# Patient Record
Sex: MALE | Race: OTHER | NOT HISPANIC OR LATINO
[De-identification: names, ages, dates, MRNs, and addresses within clinical notes are randomized per-mention and may not be internally consistent; named-entity substitution may affect disease eponyms.]

---

## 2023-03-30 PROBLEM — Z00.00 ENCOUNTER FOR PREVENTIVE HEALTH EXAMINATION: Status: ACTIVE | Noted: 2023-03-30

## 2023-03-31 ENCOUNTER — APPOINTMENT (OUTPATIENT)
Dept: ORTHOPEDIC SURGERY | Facility: CLINIC | Age: 78
End: 2023-03-31
Payer: MEDICARE

## 2023-03-31 VITALS
DIASTOLIC BLOOD PRESSURE: 66 MMHG | OXYGEN SATURATION: 98 % | BODY MASS INDEX: 33.21 KG/M2 | HEIGHT: 70 IN | HEART RATE: 68 BPM | WEIGHT: 232 LBS | SYSTOLIC BLOOD PRESSURE: 131 MMHG | TEMPERATURE: 98.4 F

## 2023-03-31 PROCEDURE — 72170 X-RAY EXAM OF PELVIS: CPT

## 2023-03-31 PROCEDURE — 73564 X-RAY EXAM KNEE 4 OR MORE: CPT | Mod: 50

## 2023-03-31 PROCEDURE — 99203 OFFICE O/P NEW LOW 30 MIN: CPT

## 2023-03-31 NOTE — REVIEW OF SYSTEMS
[Joint Pain] : joint pain [Negative] : Gastrointestinal [Joint Stiffness] : no joint stiffness [Joint Swelling] : no joint swelling [Fever] : no fever [Chills] : no chills [FreeTextEntry5] : CAD s/p cath , CHF, HTN, HLD [FreeTextEntry6] : ASHOK [FreeTextEntry8] : BPH [de-identified] : Gout [de-identified] : Lumbar Radiculopathy [de-identified] : Diabetes

## 2023-03-31 NOTE — PHYSICAL EXAM
[de-identified] : Constitutional:  77 year old male, alert and oriented, cooperative, in no acute distress.\par \par HEENT \par NC/AT.  Appearance: symmetric\par \par Chest/Respiratory \par Respiratory effort: no intercostal retractions or use of accessory muscles. Nonlabored Breathing\par \par Mental Status: \par Judgment, insight: intact\par Orientation: oriented to time, place, and person\par \par Left Knee\par \par Inspection:\par     Skin intact, no rashes or lesions\par     No Effusion\par     Non-tender to palpation over tibial tubercle, patella,and pes insertion.\par     Tenderness over the medial and lateral joint lines (anteriorly and at midcoronal line)\par \par Range of Motion:\par 	Extension - 0 degrees\par 	Flexion - 120 degrees\par                 Alignment: Varus 3 degrees\par 	Extensor lag: None\par \par Stability:\par      Demonstrates no Varus or Valgus instability\par      Negative Anterior or Posterior drawer.\par      Negative Lachman's\par      Negative McMurrays\par \par Patella: stable, tracks well. \par \par Right Knee\par \par Inspection:\par     Skin intact, no rashes or lesions\par     No Effusion\par     Non-tender to palpation over tibial tubercle and pes insertion.\par     Tenderness over patella. Tenderness over the medial and lateral joint lines (anteriorly and midcoronal line)\par \par Range of Motion:\par 	Extension - 0 degrees\par 	Flexion - 120 degrees\par 	Alignment - Varus 3 degrees\par 	Extensor lag: None\par \par Stability:\par      Demonstrates no Varus or Valgus instability\par      Negative Anterior or Posterior drawer.\par      Negative Lachman's\par      Negative McMurrays\par \par Patella: stable, tracks well. \par \par Neurologic Exam\par     Motor intact including 5/5 Extensor Hallucis Longus, 5/5 Flexor Hallucis Longus, 5/5 Tibialis Anterior and 5/5 Gastrocnemius\par     Sensation Intact to Light Touch including Saphenous, Sural, Superficial Peroneal, Deep Peroneal, Tibial nerve distributions\par \par Vascular Exam\par     Foot is warm and well perfused with 2+ Dorsalis Pedis Pulse \par \par No pain with range of motion of the bilateral hips. No lumbar paraspinal muscle tenderness. There is tenderness over the midline lumbar spine and bilateral SI joints. [de-identified] : XRay:  XRays of the Pelvis (1 View) taken in the office today and discussed with the patient. XRays demonstrate no obvious fracture or dislocation. There is joint space narrowing in the bilateral hips. (my personal interpretation). \par \par XRay: XRays of the Right Knee (4 Views) taken in the office today and reviewed with the patient. XRays demonstrate tricompartmental joint space narrowing with bone on bone articulations, with subchondral sclerosis, overlying osteophytes, all consistent with severe osteoarthritis, KL rdGrdrrdarddrderd:rd rd3rd. There appears to be a healed prior distal femur fracture. (my personal interpretation) \par \par XRay: XRays of the Left Knee (4 Views) taken in the office today and reviewed with the patient. XRays demonstrate tricompartmental joint space narrowing, worse in the medial compartment, with subchondral sclerosis, overlying osteophytes, all consistent with osteoarthritis, KL Grade: 2-3. (my personal interpretation)

## 2023-03-31 NOTE — HISTORY OF PRESENT ILLNESS
[de-identified] : Mr. Guzmán is a 77-year-old male who presents to the office for evaluation of his bilateral knee pain.  Patient has been experiencing bilateral (left greater than right) knee pain for several years, but has been worsening over the last month.  He states that he has significant pain in that it can affect his sleep.  Pain is located over the medial, lateral, and anterior knees bilaterally.  Pain can radiate to the foot.  He also reports some crepitus.  Pain is worse at night and with walking.  He has not noted any relieving factors at this time.  He has tried diclofenac and Tylenol, without significant relief.  Patient has tried physical therapy, last about 1 month ago.  He has tried injections, last on 1/25/2023.  Patient was getting injections every 6 months with about 7 injections in the left knee and 2 injections in the right knee.  His last injection helped for about 2 to 3 weeks.  He is also previously had a knee aspiration, which helped.  Pain is now affecting his quality of life and causing difficulty sleeping and resting.  Patient has been using a cane for the last 3 to 4 years.  He does have a history of lumbar radiculopathy and was referred by his primary care physician to pain management for potential epidural injections.  Patient also has a history of a right knee fracture in 1980 that was treated nonoperatively.

## 2023-03-31 NOTE — DISCUSSION/SUMMARY
[de-identified] : Mr. Guzmán is a 77-year-old male who presented to the office for evaluation of his bilateral knee pain.  Patient has been experiencing bilateral (left greater than right) knee pain for several years, but has worsened over the last few months.  He has tried physical therapy, anti-inflammatories, and injections.  X-rays showed significant bilateral knee osteoarthritis.  Examination showed some tenderness over the knees, but otherwise good bilateral knee range of motion. Discussed with the patient the examination and imaging findings.  Discussed with the patient the operative and nonoperative management of knee osteoarthritis, including total knee arthroplasty.   Discussed total knee arthroplasty at length, including surgical procedure, hospital course, DVT prophylaxis, antibiotics, physical therapy, recovery, and risks. Patient would like to continue nonoperative management at this time.  Discussed the nonoperative management of knee osteoarthritis, including physical therapy, anti-inflammatories, and injections.  Patient was given referral for physical therapy.  Patient will continue his diclofenac as needed.  Patient would like to think about his options, including total knee arthroplasty versus further injections.  Discussed that it be beneficial to undergo further evaluation of his lumbar radiculopathy by a spine specialist.  Patient was given referral to Dr. Barker. Patient will follow-up in 2 weeks for reevaluation and management.  Patient understanding and in agreement the plan.  All questions answered. \par \par \par Plan:\par -Physical therapy\par -Continue diclofenac\par -Follow-up with Dr. Barker for evaluation of lumbar radiculopathy\par -Follow-up in 2 weeks for reevaluation and management.  Consideration of TKA versus injection.

## 2023-04-03 ENCOUNTER — APPOINTMENT (OUTPATIENT)
Dept: ORTHOPEDIC SURGERY | Facility: CLINIC | Age: 78
End: 2023-04-03
Payer: MEDICARE

## 2023-04-03 VITALS
OXYGEN SATURATION: 98 % | BODY MASS INDEX: 33.21 KG/M2 | TEMPERATURE: 97.6 F | HEART RATE: 65 BPM | HEIGHT: 70 IN | WEIGHT: 232 LBS | DIASTOLIC BLOOD PRESSURE: 74 MMHG | SYSTOLIC BLOOD PRESSURE: 153 MMHG

## 2023-04-03 PROCEDURE — 72082 X-RAY EXAM ENTIRE SPI 2/3 VW: CPT

## 2023-04-03 PROCEDURE — 99214 OFFICE O/P EST MOD 30 MIN: CPT

## 2023-04-03 RX ORDER — IBUPROFEN 800 MG/1
800 TABLET, FILM COATED ORAL 3 TIMES DAILY
Qty: 42 | Refills: 0 | Status: ACTIVE | COMMUNITY
Start: 2023-04-03 | End: 1900-01-01

## 2023-04-03 NOTE — PHYSICAL EXAM
[de-identified] : Lumbar Physical Exam\par \par Gait - Antalgic, ambulating with a walker \par \par Station - Forward pitched posture \par \par Sagittal balance - Normal\par \par Compensatory mechanism? - None\par \par Reflexes\par Patellar - normal\par Gastroc - normal\par Clonus - No\par \par Hip Exam - Normal\par \par Straight leg raise - none\par \par Pulses - 2+ dp/pt\par \par Range of motion - normal\par \par Sensation\par Sensation intact to light touch in L1, L2, L3, L4, L5 and S1 dermatomes bilaterally\par \par Motor \par 	IP	Quad	HS	TA	Gastroc	EHL\par Right	5/5	5/5	5/5	5/5	5/5	5/5\par Left	3+/5	5/5	5/5	5/5	5/5	5/5  [de-identified] : Scoliosis radiographs \par SVA within normal limits \par degenerative scoliosis noted \par disc degeneration noted

## 2023-04-03 NOTE — ASSESSMENT
[FreeTextEntry1] : I had a lengthy discussion with the patient in regards to their treatment plan and diagnosis. Their symptoms have persisted despite the conservative management they have attempted thus far.  As a result I would like to proceed with a lumbar MRI.  In tandem with this they should begin a physical therapy/home therapy program.  The patient can take Tylenol/NSAIDs as needed for pain control if medically able to.  I will have the patient follow-up in 3 to 4 weeks for repeat clinical evaluation.  I encouraged them to follow-up sooner if their symptoms worsen or change in any way. \par Conservative Treatment Statement\par The patient has tried the following treatments:\par Activity modification         	+\par Ice/Compression                    +\par Braces                                     -\par NSAIDS                                  +\par Physical Therapy                    +\par \par Please note the above modalities have been tried for 6+ weeks over the past 2 months.

## 2023-04-03 NOTE — HISTORY OF PRESENT ILLNESS
[de-identified] : 77 year old male who presents for initial evaluation of his lower back pain and radicular sxs down the posterior aspect of his b/l lower extremities. Patient reports hes been experiencing back pain for years but reports onset of radicular sxs for the past few months. Patient reports pain is exacerbated when he is laying down. Patient reports he would be able to walk 5 blocks with no difficulty. Patient reports taking Tylenol for sxs with no relief. Patient reports attending multiple sessions of physical therapy with minor relief. \par Denies any saddle anesthesia. Denies any bowel/bladder incontinence

## 2023-04-03 NOTE — ADDENDUM
[FreeTextEntry1] : I, Sarah Kat, acted solely as a scribe for Dr. Aaron Barker MD on this date 04/03/2023  \par \par All medical record entries made by the Scribe were at my, Dr. Aaron Barker MD., direction and personally dictated by me on 04/03/2023 . I have reviewed the chart and agree that the record accurately reflects my personal performance of the history, physical exam, assessment and plan. I have also personally directed, reviewed, and agreed with the chart.

## 2023-04-05 RX ORDER — DIAZEPAM 10 MG/1
10 TABLET ORAL
Qty: 1 | Refills: 0 | Status: ACTIVE | COMMUNITY
Start: 2023-04-05 | End: 1900-01-01

## 2023-04-08 ENCOUNTER — OUTPATIENT (OUTPATIENT)
Dept: OUTPATIENT SERVICES | Facility: HOSPITAL | Age: 78
LOS: 1 days | End: 2023-04-08
Payer: MEDICARE

## 2023-04-08 ENCOUNTER — APPOINTMENT (OUTPATIENT)
Dept: MRI IMAGING | Facility: IMAGING CENTER | Age: 78
End: 2023-04-08
Payer: MEDICARE

## 2023-04-08 DIAGNOSIS — Z00.8 ENCOUNTER FOR OTHER GENERAL EXAMINATION: ICD-10-CM

## 2023-04-08 PROCEDURE — 72148 MRI LUMBAR SPINE W/O DYE: CPT

## 2023-04-08 PROCEDURE — 72148 MRI LUMBAR SPINE W/O DYE: CPT | Mod: 26

## 2023-04-25 ENCOUNTER — APPOINTMENT (OUTPATIENT)
Dept: ORTHOPEDIC SURGERY | Facility: CLINIC | Age: 78
End: 2023-04-25
Payer: MEDICARE

## 2023-04-25 PROCEDURE — 99213 OFFICE O/P EST LOW 20 MIN: CPT

## 2023-04-25 RX ORDER — HYLAN G-F 20 16MG/2ML
16 SYRINGE (ML) INTRAARTICULAR
Qty: 1 | Refills: 0 | Status: ACTIVE | COMMUNITY
Start: 2023-04-25

## 2023-04-25 NOTE — DISCUSSION/SUMMARY
[de-identified] : Mr. Guzmán is a 77-year-old male who presented to the office for evaluation of his bilateral knee pain.  Patient has been experiencing bilateral (left greater than right) knee pain for several years, but has worsened over the last few months.  He has tried physical therapy, anti-inflammatories, and injections.  Prior x-rays showed significant bilateral knee osteoarthritis.  Examination showed some tenderness over the knees, but otherwise good bilateral knee range of motion. Discussed with the patient the examination and imaging findings.  Discussed with the patient the operative and nonoperative management of knee osteoarthritis, including total knee arthroplasty.  Patient would like to continue nonoperative management at this time.  Discussed the nonoperative management of knee osteoarthritis, including physical therapy, anti-inflammatories, and injections.  Patient will continue his physical therapy.  Patient will take anti-inflammatories as needed.  Patient would like to try viscosupplementation injections.  Discussed the risks of viscosupplementation injections including the risk that injections do not help with pain.  Left knee Synvisc injections were ordered.  Discussed following up with Dr. Barker for follow-up of his lumbar spine MRI.  Patient will follow-up for his left knee Synvisc injections.  Patient understanding and in agreement the plan.  All questions answered. \par \par Plan:\par -Left knee Synvisc injections ordered\par -Continue physical therapy\par -Continue anti-inflammatories as needed\par -Follow-up with Dr. Barker for management of lumbar radiculopathy\par -Follow-up for left knee Synvisc injections

## 2023-04-25 NOTE — PHYSICAL EXAM
[de-identified] : Constitutional:  77 year old male, alert and oriented, cooperative, in no acute distress.\par \par HEENT \par NC/AT.  Appearance: symmetric\par \par Chest/Respiratory \par Respiratory effort: no intercostal retractions or use of accessory muscles. Nonlabored Breathing\par \par Mental Status: \par Judgment, insight: intact\par Orientation: oriented to time, place, and person\par \par Left Knee\par \par Inspection:\par     Skin intact, no rashes or lesions\par     No Effusion\par     Non-tender to palpation over tibial tubercle and pes insertion.\par     Tenderness over the medial and lateral joint lines (anteriorly and at midcoronal line). Tenderness over the patellar tendon\par \par Range of Motion:\par 	Extension - 0 degrees\par 	Flexion - 120 degrees\par                 Alignment: Varus 3 degrees\par 	Extensor lag: None\par \par Stability:\par      Demonstrates no Varus or Valgus instability\par      Negative Anterior or Posterior drawer.\par      Negative Lachman's\par      Negative McMurrays\par \par Patella: stable, tracks well. \par \par Right Knee\par \par Inspection:\par     Skin intact, no rashes or lesions\par     No Effusion\par     Non-tender to palpation over tibial tubercle, medial and lateral joint line, and pes insertion.\par     Tenderness over the posterior knee\par \par Range of Motion:\par 	Extension - 0 degrees\par 	Flexion - 120 degrees\par 	Alignment - Varus 3 degrees\par 	Extensor lag: None\par \par Stability:\par      Demonstrates no Varus or Valgus instability\par      Negative Anterior or Posterior drawer.\par      Negative Lachman's\par      Negative McMurrays\par \par Patella: stable, tracks well. \par \par Neurologic Exam\par     Motor intact including 5/5 Extensor Hallucis Longus, 5/5 Flexor Hallucis Longus, 5/5 Tibialis Anterior and 5/5 Gastrocnemius\par     Sensation Intact to Light Touch including Saphenous, Sural, Superficial Peroneal, Deep Peroneal, Tibial nerve distributions\par     Decreased sensation on the left compared to the right.\par \par Vascular Exam\par     Foot is warm and well perfused with 2+ Dorsalis Pedis Pulse \par \par No pain with range of motion of the bilateral hips. There is lumbar paraspinal muscle tenderness. There is tenderness over the midline lumbar spine and bilateral SI joints. [de-identified] : XRay:  XRays of the Pelvis (1 View) taken on 3/31/2023. XRays demonstrate no obvious fracture or dislocation. There is joint space narrowing in the bilateral hips. (my personal interpretation). \par \par XRay: XRays of the Right Knee (4 Views) taken on 3/31/2023. XRays demonstrate tricompartmental joint space narrowing with bone on bone articulations, with subchondral sclerosis, overlying osteophytes, all consistent with severe osteoarthritis, KL thGthrthathdtheth:th th5th. There appears to be a healed prior distal femur fracture. (my personal interpretation) \par \par XRay: XRays of the Left Knee (4 Views) taken  on 3/31/2023. XRays demonstrate tricompartmental joint space narrowing, worse in the medial compartment, with subchondral sclerosis, overlying osteophytes, all consistent with osteoarthritis, KL Grade: 2-3. (my personal interpretation)

## 2023-04-25 NOTE — HISTORY OF PRESENT ILLNESS
[de-identified] : Nelia  ID: [id number]\par \par 4/25/2023\par \par Mr. Guzmán is a 77-year-old male who presents to the office for follow-up of his bilateral knee pain.  Patient has been experiencing bilateral (left greater than right) knee pain for several years.  He was previously seen in the office and referred to physical therapy.  He has been in physical therapy, which helps while he is at physical therapy, but his pain returns when he goes home.  He states that overall, his pain is unchanged.  Pain is located over the anterior knee and radiates down the leg to the foot.  He underwent recent lumbar spine MRI by Dr. Barker.  No numbness or tingling.  He does have some tenderness over the knee and lower leg.  Patient has previously tried diclofenac and multiple injections for his knee pain.\par \par 3/31/2023\par Mr. Guzmán is a 77-year-old male who presents to the office for evaluation of his bilateral knee pain.  Patient has been experiencing bilateral (left greater than right) knee pain for several years, but has been worsening over the last month.  He states that he has significant pain in that it can affect his sleep.  Pain is located over the medial, lateral, and anterior knees bilaterally.  Pain can radiate to the foot.  He also reports some crepitus.  Pain is worse at night and with walking.  He has not noted any relieving factors at this time.  He has tried diclofenac and Tylenol, without significant relief.  Patient has tried physical therapy, last about 1 month ago.  He has tried injections, last on 1/25/2023.  Patient was getting injections every 6 months with about 7 injections in the left knee and 2 injections in the right knee.  His last injection helped for about 2 to 3 weeks.  He is also previously had a knee aspiration, which helped.  Pain is now affecting his quality of life and causing difficulty sleeping and resting.  Patient has been using a cane for the last 3 to 4 years.  He does have a history of lumbar radiculopathy and was referred by his primary care physician to pain management for potential epidural injections.  Patient also has a history of a right knee fracture in 1980 that was treated nonoperatively.\par \par \par History: CAD s/p cath, CHF, HTN, HLD, ASHOK, BPH, Gout, Lumbar Radiculopathy, Diabetes

## 2023-04-28 ENCOUNTER — APPOINTMENT (OUTPATIENT)
Dept: ORTHOPEDIC SURGERY | Facility: CLINIC | Age: 78
End: 2023-04-28
Payer: MEDICARE

## 2023-04-28 VITALS
WEIGHT: 232 LBS | OXYGEN SATURATION: 98 % | HEIGHT: 70 IN | HEART RATE: 62 BPM | TEMPERATURE: 98 F | BODY MASS INDEX: 33.21 KG/M2

## 2023-04-28 DIAGNOSIS — M54.16 RADICULOPATHY, LUMBAR REGION: ICD-10-CM

## 2023-04-28 PROCEDURE — 99214 OFFICE O/P EST MOD 30 MIN: CPT

## 2023-04-28 RX ORDER — TIZANIDINE 2 MG/1
2 TABLET ORAL EVERY 6 HOURS
Qty: 24 | Refills: 0 | Status: ACTIVE | COMMUNITY
Start: 2023-04-28 | End: 1900-01-01

## 2023-04-28 RX ORDER — DICLOFENAC SODIUM 75 MG/1
75 TABLET, DELAYED RELEASE ORAL
Qty: 40 | Refills: 0 | Status: ACTIVE | COMMUNITY
Start: 2023-04-28 | End: 1900-01-01

## 2023-04-28 NOTE — HISTORY OF PRESENT ILLNESS
[de-identified] : 77 year old male who presents for follow-up evaluation of his lower back pain and radicular sxs down the posterior aspect of his b/l lower extremities, LT > RT. Today, the patient reports his sxs have persisted relative to his previous appt. He reports difficulty sleeping due to pain. He is here today to discuss his MRI results and next steps in his care. \par Denies any bowel/bladder incontinence. Denies any saddle anesthesia. \par \par 04/03/2023\par 77 year old male who presents for initial evaluation of his lower back pain and radicular sxs down the posterior aspect of his b/l lower extremities. Patient reports hes been experiencing back pain for years but reports onset of radicular sxs for the past few months. Patient reports pain is exacerbated when he is laying down. Patient reports he would be able to walk 5 blocks with no difficulty. Patient reports taking Tylenol for sxs with no relief. Patient reports attending multiple sessions of physical therapy with minor relief. \par Denies any saddle anesthesia. Denies any bowel/bladder incontinence

## 2023-04-28 NOTE — PHYSICAL EXAM
[de-identified] : Lumbar Physical Exam\par \par Gait - Antalgic\par \par Station - Forward pitched posture \par \par Sagittal balance - Normal\par \par Compensatory mechanism? - None\par \par Reflexes\par Patellar - normal\par Gastroc - normal\par Clonus - No\par \par Hip Exam - Normal\par \par Straight leg raise - none\par \par Pulses - 2+ dp/pt\par \par Range of motion - normal\par \par Sensation\par Sensation intact to light touch in L1, L2, L3, L4, L5 and S1 dermatomes bilaterally\par \par Motor \par 	IP	Quad	HS	TA	Gastroc	EHL\par Right	5/5	5/5	5/5	5/5	5/5	5/5\par Left	3+/5	5/5	5/5	5/5	5/5	5/5  [de-identified] : Scoliosis radiographs \par SVA within normal limits \par degenerative scoliosis noted \par disc degeneration noted \par \par Lumbar MRI reviewed \par L3-S1 areas of severe central stenosis noted

## 2023-04-28 NOTE — ADDENDUM
[FreeTextEntry1] : I, Sarah Kat, acted solely as a scribe for Dr. Aaron Barker MD on this date 04/28/2023  \par \par All medical record entries made by the Scribe were at my, Dr. Aaron Barker MD., direction and personally dictated by me on 04/28/2023 . I have reviewed the chart and agree that the record accurately reflects my personal performance of the history, physical exam, assessment and plan. I have also personally directed, reviewed, and agreed with the chart.

## 2023-04-28 NOTE — REASON FOR VISIT
[Back Pain] : back pain [Radiculopathy] : radiculopathy [Follow-Up Visit] : a follow-up visit for [Interpreters_IDNumber] : [id number] [[id number]] : Nelia

## 2023-04-28 NOTE — ASSESSMENT
[FreeTextEntry1] : I had a lengthy discussion with the patient in regards to their treatment plan and diagnosis. Their symptoms have persisted despite the conservative management they have attempted thus far.  As a result I would like to proceed with a referral to [facility] to have the patient managed by a pain management specialist to see if they are eligible for a epidural injection.  In tandem with this they should continue with physical therapy/home therapy program. The patient can take Tylenol/NSAIDs as needed for pain control if medically able to. Rx Diclofenac and Tizanidine. I discussed with the patient that we should continue to exhaust non-operative treatments before broaching the idea of surgery. The patient and the patient's family are in agreement with this. I will have the patient follow-up in 6 weeks for repeat clinical evaluation.  I encouraged them to follow-up sooner if their symptoms worsen or change in any way.

## 2023-05-11 ENCOUNTER — APPOINTMENT (OUTPATIENT)
Dept: ORTHOPEDIC SURGERY | Facility: CLINIC | Age: 78
End: 2023-05-11
Payer: MEDICARE

## 2023-05-11 PROCEDURE — 20610 DRAIN/INJ JOINT/BURSA W/O US: CPT | Mod: 50

## 2023-05-16 RX ORDER — HYLAN G-F 20 16MG/2ML
16 SYRINGE (ML) INTRAARTICULAR
Qty: 1 | Refills: 0 | Status: ACTIVE | COMMUNITY
Start: 2023-05-16

## 2023-05-17 NOTE — HISTORY OF PRESENT ILLNESS
[de-identified] : Nelia  ID: [id number]\par \par 5/11/2023\par \par Mr. Guzmán presented to the office for follow up of his bilateral knee pain. Patient continues to have bilateral knee pain (left greater than right). He reports that his pain is worse after drinking alcohol (patient has a history of gout). There have been no falls or injuries. No fevers or chills. Patient presents for viscosupplementation injections.\par \par 4/25/2023\par \par Mr. Guzmán is a 77-year-old male who presents to the office for follow-up of his bilateral knee pain.  Patient has been experiencing bilateral (left greater than right) knee pain for several years.  He was previously seen in the office and referred to physical therapy.  He has been in physical therapy, which helps while he is at physical therapy, but his pain returns when he goes home.  He states that overall, his pain is unchanged.  Pain is located over the anterior knee and radiates down the leg to the foot.  He underwent recent lumbar spine MRI by Dr. Barker.  No numbness or tingling.  He does have some tenderness over the knee and lower leg.  Patient has previously tried diclofenac and multiple injections for his knee pain.\par \par 3/31/2023\par Mr. Guzmán is a 77-year-old male who presents to the office for evaluation of his bilateral knee pain.  Patient has been experiencing bilateral (left greater than right) knee pain for several years, but has been worsening over the last month.  He states that he has significant pain in that it can affect his sleep.  Pain is located over the medial, lateral, and anterior knees bilaterally.  Pain can radiate to the foot.  He also reports some crepitus.  Pain is worse at night and with walking.  He has not noted any relieving factors at this time.  He has tried diclofenac and Tylenol, without significant relief.  Patient has tried physical therapy, last about 1 month ago.  He has tried injections, last on 1/25/2023.  Patient was getting injections every 6 months with about 7 injections in the left knee and 2 injections in the right knee.  His last injection helped for about 2 to 3 weeks.  He is also previously had a knee aspiration, which helped.  Pain is now affecting his quality of life and causing difficulty sleeping and resting.  Patient has been using a cane for the last 3 to 4 years.  He does have a history of lumbar radiculopathy and was referred by his primary care physician to pain management for potential epidural injections.  Patient also has a history of a right knee fracture in 1980 that was treated nonoperatively.\par \par History: CAD s/p cath, CHF, HTN, HLD, ASHOK, BPH, Gout, Lumbar Radiculopathy, Diabetes

## 2023-05-17 NOTE — DISCUSSION/SUMMARY
[de-identified] : Mr. Guzmán is a 77-year-old male who presented to the office for follow up of his bilateral knee pain.  Patient has been experiencing bilateral (left greater than right) knee pain for several years, but has worsened over the last few months. Prior x-rays showed significant bilateral knee osteoarthritis.  Examination showed some tenderness over the knees, but otherwise good bilateral knee range of motion. Discussed with the patient the examination and imaging findings.  Discussed with the patient the management of knee osteoarthritis.  Patient would like to continue nonoperative management at this time.  Discussed the nonoperative management of knee osteoarthritis, including physical therapy, anti-inflammatories, and injections.  Patient will continue his physical therapy.  Patient will take anti-inflammatories as needed.  Patient would like to try viscosupplementation injections.  Discussed the risks of viscosupplementation injections including the risk that injections do not help with pain.  Bilateral knee Synvisc injections were given using aseptic technique. Patient tolerated the procedure well.. Patient will follow-up for his second bilateral knee Synvisc injections.  Patient understanding and in agreement the plan.  All questions answered. \par \par Plan:\par -Bilateral knee Synvisc injections given\par -Continue physical therapy\par -Continue anti-inflammatories as needed\par -Follow-up for second Bilateral knee Synvisc injections\par \par Procedure Note: Bilateral knee Synvisc injections\par Right:\par LOT: [id number]\par Exp: 8/2025\par \par Left: \par LOT: [id number]\par Exp: 8/2025\par \par Bilateral knee injections were given today. Risk and benefits of the injection were discussed, including but not limited to infection and failure to achieve desired results.\par The area was prepped in the usual sterile fashion. Bilateral Knee Synvisc Injections were given using aseptic technique. The patient tolerated the procedure well.\par \par Patient was instructed to call or return for any signs or symptoms of infection after an injection including increased pain, swelling, redness or fevers.

## 2023-05-17 NOTE — PHYSICAL EXAM
[de-identified] : Constitutional:  77 year old male, alert and oriented, cooperative, in no acute distress.\par \par HEENT \par NC/AT.  Appearance: symmetric\par \par Chest/Respiratory \par Respiratory effort: no intercostal retractions or use of accessory muscles. Nonlabored Breathing\par \par Mental Status: \par Judgment, insight: intact\par Orientation: oriented to time, place, and person\par \par Left Knee\par \par Inspection:\par     Skin intact, no rashes or lesions\par     No Effusion\par     Non-tender to palpation over tibial tubercle and pes insertion.\par     Tenderness over the medial and lateral joint lines (anteriorly and at midcoronal line). Tenderness over the patellar tendon\par \par Range of Motion:\par 	Extension - 0 degrees\par 	Flexion - 120 degrees\par                 Alignment: Varus 3 degrees\par 	Extensor lag: None\par \par Stability:\par      Demonstrates no Varus or Valgus instability\par      Negative Anterior or Posterior drawer.\par      Negative Lachman's\par      Negative McMurrays\par \par Patella: stable, tracks well. \par \par Right Knee\par \par Inspection:\par     Skin intact, no rashes or lesions\par     No Effusion\par     Non-tender to palpation over tibial tubercle, medial and lateral joint line, and pes insertion.\par     Tenderness over the posterior knee\par \par Range of Motion:\par 	Extension - 0 degrees\par 	Flexion - 120 degrees\par 	Alignment - Varus 3 degrees\par 	Extensor lag: None\par \par Stability:\par      Demonstrates no Varus or Valgus instability\par      Negative Anterior or Posterior drawer.\par      Negative Lachman's\par      Negative McMurrays\par \par Patella: stable, tracks well. \par \par Neurologic Exam\par     Motor intact including 5/5 Extensor Hallucis Longus, 5/5 Flexor Hallucis Longus, 5/5 Tibialis Anterior and 5/5 Gastrocnemius\par     Sensation Intact to Light Touch including Saphenous, Sural, Superficial Peroneal, Deep Peroneal, Tibial nerve distributions\par     Decreased sensation on the left compared to the right.\par \par Vascular Exam\par     Foot is warm and well perfused with 2+ Dorsalis Pedis Pulse \par \par No pain with range of motion of the bilateral hips. There is lumbar paraspinal muscle tenderness. There is tenderness over the midline lumbar spine and bilateral SI joints. [de-identified] : XRay:  XRays of the Pelvis (1 View) taken on 3/31/2023. XRays demonstrate no obvious fracture or dislocation. There is joint space narrowing in the bilateral hips. (my personal interpretation). \par \par XRay: XRays of the Right Knee (4 Views) taken on 3/31/2023. XRays demonstrate tricompartmental joint space narrowing with bone on bone articulations, with subchondral sclerosis, overlying osteophytes, all consistent with severe osteoarthritis, KL rdGrdrrdarddrderd:rd rd3rd. There appears to be a healed prior distal femur fracture. (my personal interpretation) \par \par XRay: XRays of the Left Knee (4 Views) taken  on 3/31/2023. XRays demonstrate tricompartmental joint space narrowing, worse in the medial compartment, with subchondral sclerosis, overlying osteophytes, all consistent with osteoarthritis, KL Grade: 2-3. (my personal interpretation)

## 2023-05-18 ENCOUNTER — APPOINTMENT (OUTPATIENT)
Dept: ORTHOPEDIC SURGERY | Facility: CLINIC | Age: 78
End: 2023-05-18
Payer: MEDICARE

## 2023-05-18 PROCEDURE — 20610 DRAIN/INJ JOINT/BURSA W/O US: CPT | Mod: 50

## 2023-05-20 NOTE — DISCUSSION/SUMMARY
[de-identified] : Mr. Guzmán is a 77-year-old male who presented to the office for follow up of his bilateral knee pain.  Patient has been experiencing bilateral (left greater than right) knee pain for several years, but has worsened over the last few months. Prior x-rays showed significant bilateral knee osteoarthritis.  Examination showed some tenderness over the knees, but otherwise good bilateral knee range of motion. Discussed with the patient the examination and imaging findings.  Discussed with the patient the management of knee osteoarthritis.  Patient would like to continue nonoperative management at this time.  Discussed the nonoperative management of knee osteoarthritis, including physical therapy, anti-inflammatories, and injections.  Patient will continue his physical therapy.  Patient will take anti-inflammatories as needed.  Patient would like to try viscosupplementation injections.  Discussed the risks of viscosupplementation injections including the risk that injections do not help with pain.  Bilateral knee Synvisc injections were given using aseptic technique. Patient tolerated the procedure well. Patient will follow-up for his third bilateral knee Synvisc injections.  Patient understanding and in agreement the plan.  All questions answered. \par \par Plan:\par -Bilateral knee Synvisc injections given\par -Continue physical therapy\par -Continue anti-inflammatories as needed\par -Follow-up for third Bilateral knee Synvisc injections\par \par Procedure Note: Bilateral knee Synvisc injections\par Right:\par LOT: [id number]\par Exp: 8/2025\par \par Left: \par LOT: [id number]\par Exp: 8/2025\par \par Bilateral knee injections were given today. Risk and benefits of the injection were discussed, including but not limited to infection and failure to achieve desired results.\par The area was prepped in the usual sterile fashion. Bilateral Knee Synvisc Injections were given using aseptic technique. The patient tolerated the procedure well.\par \par Patient was instructed to call or return for any signs or symptoms of infection after an injection including increased pain, swelling, redness or fevers.

## 2023-05-20 NOTE — PHYSICAL EXAM
[de-identified] : Constitutional:  77 year old male, alert and oriented, cooperative, in no acute distress.\par \par HEENT \par NC/AT.  Appearance: symmetric\par \par Chest/Respiratory \par Respiratory effort: no intercostal retractions or use of accessory muscles. Nonlabored Breathing\par \par Mental Status: \par Judgment, insight: intact\par Orientation: oriented to time, place, and person\par \par Left Knee\par \par Inspection:\par     Skin intact, no rashes or lesions\par     No Effusion\par     Non-tender to palpation over tibial tubercle, lateral joint line, and pes insertion.\par     Tenderness over the medial joint line (anteriorly and at midcoronal line)\par \par Range of Motion:\par 	Extension - 0 degrees\par 	Flexion - 120 degrees\par                 Alignment: Varus 3 degrees\par 	Extensor lag: None\par \par Stability:\par      Demonstrates no Varus or Valgus instability\par      Negative Anterior or Posterior drawer.\par      Negative Lachman's\par      Negative McMurrays\par \par Patella: stable, tracks well. \par \par Right Knee\par \par Inspection:\par     Skin intact, no rashes or lesions\par     No Effusion\par     Non-tender to palpation over tibial tubercle, medial and lateral joint line, and pes insertion.\par \par Range of Motion:\par 	Extension - 0 degrees\par 	Flexion - 120 degrees\par 	Alignment - Varus 3 degrees\par 	Extensor lag: None\par \par Stability:\par      Demonstrates no Varus or Valgus instability\par      Negative Anterior or Posterior drawer.\par      Negative Lachman's\par      Negative McMurrays\par \par Patella: stable, tracks well. \par \par Neurologic Exam\par     Motor intact including 5/5 Extensor Hallucis Longus, 5/5 Flexor Hallucis Longus, 5/5 Tibialis Anterior and 5/5 Gastrocnemius\par     Sensation Intact to Light Touch including Saphenous, Sural, Superficial Peroneal, Deep Peroneal, Tibial nerve distributions\par \par Vascular Exam\par     Foot is warm and well perfused with 2+ Dorsalis Pedis Pulse \par \par No pain with range of motion of the bilateral hips. [de-identified] : XRay:  XRays of the Pelvis (1 View) taken on 3/31/2023. XRays demonstrate no obvious fracture or dislocation. There is joint space narrowing in the bilateral hips. (my personal interpretation). \par \par XRay: XRays of the Right Knee (4 Views) taken on 3/31/2023. XRays demonstrate tricompartmental joint space narrowing with bone on bone articulations, with subchondral sclerosis, overlying osteophytes, all consistent with severe osteoarthritis, KL thGthrthathdtheth:th th5th. There appears to be a healed prior distal femur fracture. (my personal interpretation) \par \par XRay: XRays of the Left Knee (4 Views) taken  on 3/31/2023. XRays demonstrate tricompartmental joint space narrowing, worse in the medial compartment, with subchondral sclerosis, overlying osteophytes, all consistent with osteoarthritis, KL Grade: 2-3. (my personal interpretation)

## 2023-05-20 NOTE — HISTORY OF PRESENT ILLNESS
[de-identified] : Nelia  ID: [id number]\par \par 5/18/2023\par \par Silviano Acosta presents to the office for follow-up of his bilateral knee pain.  Patient continues to have some bilateral knee pain.  He tolerated the injections well.  His pain has somewhat improved following viscosupplementation injection .  No fevers or chills.  Patient presents for his second bilateral knee viscosupplementation injections.\par  \par 5/11/2023\par \par Mr. Guzmán presented to the office for follow up of his bilateral knee pain. Patient continues to have bilateral knee pain (left greater than right). He reports that his pain is worse after drinking alcohol (patient has a history of gout). There have been no falls or injuries. No fevers or chills. Patient presents for viscosupplementation injections.\par \par 4/25/2023\par \par Mr. Guzmán is a 77-year-old male who presents to the office for follow-up of his bilateral knee pain.  Patient has been experiencing bilateral (left greater than right) knee pain for several years.  He was previously seen in the office and referred to physical therapy.  He has been in physical therapy, which helps while he is at physical therapy, but his pain returns when he goes home.  He states that overall, his pain is unchanged.  Pain is located over the anterior knee and radiates down the leg to the foot.  He underwent recent lumbar spine MRI by Dr. Barker.  No numbness or tingling.  He does have some tenderness over the knee and lower leg.  Patient has previously tried diclofenac and multiple injections for his knee pain.\par \par 3/31/2023\par Mr. Guzmán is a 77-year-old male who presents to the office for evaluation of his bilateral knee pain.  Patient has been experiencing bilateral (left greater than right) knee pain for several years, but has been worsening over the last month.  He states that he has significant pain in that it can affect his sleep.  Pain is located over the medial, lateral, and anterior knees bilaterally.  Pain can radiate to the foot.  He also reports some crepitus.  Pain is worse at night and with walking.  He has not noted any relieving factors at this time.  He has tried diclofenac and Tylenol, without significant relief.  Patient has tried physical therapy, last about 1 month ago.  He has tried injections, last on 1/25/2023.  Patient was getting injections every 6 months with about 7 injections in the left knee and 2 injections in the right knee.  His last injection helped for about 2 to 3 weeks.  He is also previously had a knee aspiration, which helped.  Pain is now affecting his quality of life and causing difficulty sleeping and resting.  Patient has been using a cane for the last 3 to 4 years.  He does have a history of lumbar radiculopathy and was referred by his primary care physician to pain management for potential epidural injections.  Patient also has a history of a right knee fracture in 1980 that was treated nonoperatively.\par \par History: CAD s/p cath, CHF, HTN, HLD, ASHOK, BPH, Gout, Lumbar Radiculopathy, Diabetes

## 2023-05-25 ENCOUNTER — APPOINTMENT (OUTPATIENT)
Dept: ORTHOPEDIC SURGERY | Facility: CLINIC | Age: 78
End: 2023-05-25
Payer: MEDICARE

## 2023-05-25 DIAGNOSIS — M25.561 PAIN IN RIGHT KNEE: ICD-10-CM

## 2023-05-25 DIAGNOSIS — M25.562 PAIN IN RIGHT KNEE: ICD-10-CM

## 2023-05-25 PROCEDURE — 20610 DRAIN/INJ JOINT/BURSA W/O US: CPT | Mod: 50

## 2023-05-25 NOTE — PHYSICAL EXAM
[de-identified] : Constitutional:  77 year old male, alert and oriented, cooperative, in no acute distress.\par \par HEENT \par NC/AT.  Appearance: symmetric\par \par Chest/Respiratory \par Respiratory effort: no intercostal retractions or use of accessory muscles. Nonlabored Breathing\par \par Mental Status: \par Judgment, insight: intact\par Orientation: oriented to time, place, and person\par \par Left Knee\par \par Inspection:\par     Skin intact, no rashes or lesions\par     No Effusion\par     Non-tender to palpation over tibial tubercle, lateral joint line, and pes insertion.\par     Tenderness over the medial joint line (anteriorly and at midcoronal line)\par \par Range of Motion:\par 	Extension - 0 degrees\par 	Flexion - 120 degrees\par                 Alignment: Varus 3 degrees\par 	Extensor lag: None\par \par Stability:\par      Demonstrates no Varus or Valgus instability\par      Negative Anterior or Posterior drawer.\par      Negative Lachman's\par      Negative McMurrays\par \par Patella: stable, tracks well. \par \par Right Knee\par \par Inspection:\par     Skin intact, no rashes or lesions\par     No Effusion\par     Non-tender to palpation over tibial tubercle, medial and lateral joint line, and pes insertion.\par \par Range of Motion:\par 	Extension - 0 degrees\par 	Flexion - 120 degrees\par 	Alignment - Varus 3 degrees\par 	Extensor lag: None\par \par Stability:\par      Demonstrates no Varus or Valgus instability\par      Negative Anterior or Posterior drawer.\par      Negative Lachman's\par      Negative McMurrays\par \par Patella: stable, tracks well. \par \par Neurologic Exam\par     Motor intact including 5/5 Extensor Hallucis Longus, 5/5 Flexor Hallucis Longus, 5/5 Tibialis Anterior and 5/5 Gastrocnemius\par     Sensation Intact to Light Touch including Saphenous, Sural, Superficial Peroneal, Deep Peroneal, Tibial nerve distributions\par \par Vascular Exam\par     Foot is warm and well perfused with 2+ Dorsalis Pedis Pulse \par \par No pain with range of motion of the bilateral hips. [de-identified] : XRay:  XRays of the Pelvis (1 View) taken on 3/31/2023. XRays demonstrate no obvious fracture or dislocation. There is joint space narrowing in the bilateral hips. (my personal interpretation). \par \par XRay: XRays of the Right Knee (4 Views) taken on 3/31/2023. XRays demonstrate tricompartmental joint space narrowing with bone on bone articulations, with subchondral sclerosis, overlying osteophytes, all consistent with severe osteoarthritis, KL thGthrthathdtheth:th th5th. There appears to be a healed prior distal femur fracture. (my personal interpretation) \par \par XRay: XRays of the Left Knee (4 Views) taken  on 3/31/2023. XRays demonstrate tricompartmental joint space narrowing, worse in the medial compartment, with subchondral sclerosis, overlying osteophytes, all consistent with osteoarthritis, KL Grade: 2-3. (my personal interpretation)

## 2023-05-25 NOTE — HISTORY OF PRESENT ILLNESS
[de-identified] : 5/25/2023\par Nelia  ID: [id number]\par \par Silviano Acosta presents to the office for follow-up of his bilateral knee pain.  Patient continues to have some bilateral knee pain.  He tolerated the injections well.  His pain has somewhat improved following viscosupplementation injection .  No fevers or chills.  Patient presents for his third bilateral knee viscosupplementation injections.\par  \par 5/18/2023\par Nelia  ID: [id number]\par \par Silviano Acosta presents to the office for follow-up of his bilateral knee pain.  Patient continues to have some bilateral knee pain.  He tolerated the injections well.  His pain has somewhat improved following viscosupplementation injection .  No fevers or chills.  Patient presents for his second bilateral knee viscosupplementation injections.\par  \par 5/11/2023\par \par Mr. Guzmán presented to the office for follow up of his bilateral knee pain. Patient continues to have bilateral knee pain (left greater than right). He reports that his pain is worse after drinking alcohol (patient has a history of gout). There have been no falls or injuries. No fevers or chills. Patient presents for viscosupplementation injections.\par \par 4/25/2023\par \par Mr. Guzmán is a 77-year-old male who presents to the office for follow-up of his bilateral knee pain.  Patient has been experiencing bilateral (left greater than right) knee pain for several years.  He was previously seen in the office and referred to physical therapy.  He has been in physical therapy, which helps while he is at physical therapy, but his pain returns when he goes home.  He states that overall, his pain is unchanged.  Pain is located over the anterior knee and radiates down the leg to the foot.  He underwent recent lumbar spine MRI by Dr. Barker.  No numbness or tingling.  He does have some tenderness over the knee and lower leg.  Patient has previously tried diclofenac and multiple injections for his knee pain.\par \par 3/31/2023\par Mr. Guzmán is a 77-year-old male who presents to the office for evaluation of his bilateral knee pain.  Patient has been experiencing bilateral (left greater than right) knee pain for several years, but has been worsening over the last month.  He states that he has significant pain in that it can affect his sleep.  Pain is located over the medial, lateral, and anterior knees bilaterally.  Pain can radiate to the foot.  He also reports some crepitus.  Pain is worse at night and with walking.  He has not noted any relieving factors at this time.  He has tried diclofenac and Tylenol, without significant relief.  Patient has tried physical therapy, last about 1 month ago.  He has tried injections, last on 1/25/2023.  Patient was getting injections every 6 months with about 7 injections in the left knee and 2 injections in the right knee.  His last injection helped for about 2 to 3 weeks.  He is also previously had a knee aspiration, which helped.  Pain is now affecting his quality of life and causing difficulty sleeping and resting.  Patient has been using a cane for the last 3 to 4 years.  He does have a history of lumbar radiculopathy and was referred by his primary care physician to pain management for potential epidural injections.  Patient also has a history of a right knee fracture in 1980 that was treated nonoperatively.\par \par History: CAD s/p cath, CHF, HTN, HLD, ASHOK, BPH, Gout, Lumbar Radiculopathy, Diabetes

## 2023-05-25 NOTE — DISCUSSION/SUMMARY
[de-identified] : Mr. Guzmán is a 77-year-old male who presented to the office for follow up of his bilateral knee pain.  Patient has been experiencing bilateral (left greater than right) knee pain for several years, but has worsened over the last few months. Prior x-rays showed significant bilateral knee osteoarthritis.  Examination showed some tenderness over the knees, but otherwise good bilateral knee range of motion. Discussed with the patient the examination and imaging findings.  Discussed with the patient the management of knee osteoarthritis.  Patient would like to continue nonoperative management at this time.  Discussed the nonoperative management of knee osteoarthritis, including physical therapy, anti-inflammatories, and injections.  Patient will continue his physical therapy.  Patient will take anti-inflammatories as needed.  Patient would like to continue his viscosupplementation injections.  Discussed the risks of viscosupplementation injections including the risk that injections do not help with pain.  Bilateral knee Synvisc injections were given using aseptic technique. Patient tolerated the procedure well. Patient will follow-up in 3 months for reevaluation and management.  Patient understanding and in agreement the plan.  All questions answered. \par \par Plan:\par -Bilateral knee Synvisc injections given\par -Continue physical therapy\par -Continue anti-inflammatories as needed\par -Follow-up for in 3 months for reevaluation and management\par \par Procedure Note: Bilateral knee Synvisc injections\par Right:\par LOT: [id number]\par Exp: 8/2025\par \par Left: \par LOT: [id number]\par Exp: 8/2025\par \par Bilateral knee injections were given today. Risk and benefits of the injection were discussed, including but not limited to infection and failure to achieve desired results.\par The area was prepped in the usual sterile fashion. Bilateral Knee Synvisc Injections were given using aseptic technique. The patient tolerated the procedure well.\par \par Patient was instructed to call or return for any signs or symptoms of infection after an injection including increased pain, swelling, redness or fevers.

## 2023-06-16 ENCOUNTER — APPOINTMENT (OUTPATIENT)
Dept: ORTHOPEDIC SURGERY | Facility: CLINIC | Age: 78
End: 2023-06-16

## 2023-08-02 ENCOUNTER — APPOINTMENT (OUTPATIENT)
Dept: ORTHOPEDIC SURGERY | Facility: CLINIC | Age: 78
End: 2023-08-02

## 2023-08-29 ENCOUNTER — APPOINTMENT (OUTPATIENT)
Dept: ORTHOPEDIC SURGERY | Facility: CLINIC | Age: 78
End: 2023-08-29
Payer: MEDICARE

## 2023-08-29 PROCEDURE — 20610 DRAIN/INJ JOINT/BURSA W/O US: CPT | Mod: 50

## 2023-08-29 PROCEDURE — 99213 OFFICE O/P EST LOW 20 MIN: CPT | Mod: 25

## 2023-08-29 PROCEDURE — 73564 X-RAY EXAM KNEE 4 OR MORE: CPT | Mod: 50

## 2023-08-29 RX ORDER — MELOXICAM 15 MG/1
15 TABLET ORAL DAILY
Qty: 14 | Refills: 1 | Status: ACTIVE | COMMUNITY
Start: 2023-08-29 | End: 1900-01-01

## 2023-08-30 ENCOUNTER — APPOINTMENT (OUTPATIENT)
Dept: ORTHOPEDIC SURGERY | Facility: CLINIC | Age: 78
End: 2023-08-30
Payer: MEDICARE

## 2023-08-30 VITALS — HEIGHT: 60 IN | WEIGHT: 230 LBS | BODY MASS INDEX: 45.16 KG/M2

## 2023-08-30 PROCEDURE — 99214 OFFICE O/P EST MOD 30 MIN: CPT

## 2023-08-30 NOTE — HISTORY OF PRESENT ILLNESS
[de-identified] : This is a 78-year-old gentleman experiencing left more than right knee pain, which is severe in intensity.  Here for second opinion.  Has known bilateral knee osteoarthritis.  Have cortisone injections yesterday by another surgeon.  The pain substantially limits activities of daily living. Walking tolerance is reduced.  Has a prescription for Mobic and physical therapy.  The patient denies any radiation of the pain to the feet and it is not associated with numbness, tingling, or weakness.

## 2023-08-30 NOTE — DISCUSSION/SUMMARY
[de-identified] : This patient has bilateral knee osteoarthritis.  The patient is not an appropriate candidate for surgical intervention at this time. An extensive discussion was conducted on the natural history of the disease and the variety of surgical and non-surgical options available to the patient including, but not limited to non-steroidal anti-inflammatory medications, steroid injections, physical therapy, maintenance of ideal body weight, and reduction of activity.  With Mobic and physical therapy which she already has.  Follow-up with Dr. Masters for further care. The patient will schedule an appointment as needed.

## 2023-08-30 NOTE — PHYSICAL EXAM
[de-identified] : Patient is well nourished, well-developed, in no acute distress, with appropriate mood and affect. The patient is oriented to time, place, and person. Respirations are even and unlabored. Gait evaluation does reveal a limp. There is no inguinal adenopathy. Bilateral limbs are well-perfused, without skin lesions, shows a grossly normal motor and sensory examination. The right knee motion is significantly reduced and does cause significant pain. The right knee moves from  degrees. The knee is stable within that range-of-motion to AP and ML stress. The alignment of the knee is 5 degrees varus. Muscle strength is normal. Pedal pulses are palpable. Hip examination was negative. The left knee motion is significantly reduced and does cause significant pain. The left knee moves from  degrees. The knee is stable within that range-of-motion to AP and ML stress. The alignment of the knee is 5 degrees varus. Muscle strength is normal. Pedal pulses are palpable. Hip examination was negative. [de-identified] : Long standing knee, AP knee, lateral knee, and patellar views of the bilateral knee were brought in by the patient which I reviewed and demonstrate degenerative joint disease of the knee with joint space narrowing, osteophyte formation, and subchondral sclerosis.

## 2023-08-31 NOTE — HISTORY OF PRESENT ILLNESS
[de-identified] : 8/29/2023 [facility]  ID: [id number]  Silviano Guzmán presents to the office for follow-up of his bilateral knee pain.  Patient continues to have bilateral (left greater than right) knee pain.  He reports difficulty with walking.  Prior viscosupplementation injections only helped for about 2 weeks.  Pain is worse with laying down and with walking.  He has tried multiple injections previously.  He has tried Tylenol and diclofenac, which gives some relief.  He has tried physical therapy, which gives temporary relief.  Patient underwent recent lumbar injection on 8/18, which helped with his back pain.  He can have some lateral hip pain.  No groin pain.  5/25/2023 [facility]  ID: [id number]  Silviano Guzmán presents to the office for follow-up of his bilateral knee pain.  Patient continues to have some bilateral knee pain.  He tolerated the injections well.  His pain has somewhat improved following viscosupplementation injection .  No fevers or chills.  Patient presents for his third bilateral knee viscosupplementation injections.   5/18/2023 [facility]  ID: [id number]  Silviano Guzmán presents to the office for follow-up of his bilateral knee pain.  Patient continues to have some bilateral knee pain.  He tolerated the injections well.  His pain has somewhat improved following viscosupplementation injection .  No fevers or chills.  Patient presents for his second bilateral knee viscosupplementation injections.   5/11/2023  Mr. Guzmán presented to the office for follow up of his bilateral knee pain. Patient continues to have bilateral knee pain (left greater than right). He reports that his pain is worse after drinking alcohol (patient has a history of gout). There have been no falls or injuries. No fevers or chills. Patient presents for viscosupplementation injections.  4/25/2023  Mr. Guzmán is a 77-year-old male who presents to the office for follow-up of his bilateral knee pain.  Patient has been experiencing bilateral (left greater than right) knee pain for several years.  He was previously seen in the office and referred to physical therapy.  He has been in physical therapy, which helps while he is at physical therapy, but his pain returns when he goes home.  He states that overall, his pain is unchanged.  Pain is located over the anterior knee and radiates down the leg to the foot.  He underwent recent lumbar spine MRI by Dr. Barker.  No numbness or tingling.  He does have some tenderness over the knee and lower leg.  Patient has previously tried diclofenac and multiple injections for his knee pain.  3/31/2023 Mr. Guzmán is a 77-year-old male who presents to the office for evaluation of his bilateral knee pain.  Patient has been experiencing bilateral (left greater than right) knee pain for several years, but has been worsening over the last month.  He states that he has significant pain in that it can affect his sleep.  Pain is located over the medial, lateral, and anterior knees bilaterally.  Pain can radiate to the foot.  He also reports some crepitus.  Pain is worse at night and with walking.  He has not noted any relieving factors at this time.  He has tried diclofenac and Tylenol, without significant relief.  Patient has tried physical therapy, last about 1 month ago.  He has tried injections, last on 1/25/2023.  Patient was getting injections every 6 months with about 7 injections in the left knee and 2 injections in the right knee.  His last injection helped for about 2 to 3 weeks.  He is also previously had a knee aspiration, which helped.  Pain is now affecting his quality of life and causing difficulty sleeping and resting.  Patient has been using a cane for the last 3 to 4 years.  He does have a history of lumbar radiculopathy and was referred by his primary care physician to pain management for potential epidural injections.  Patient also has a history of a right knee fracture in 1980 that was treated nonoperatively.  History: CAD s/p cath, CHF, HTN, HLD, ASHOK, BPH, Gout, Lumbar Radiculopathy, Diabetes

## 2023-08-31 NOTE — DISCUSSION/SUMMARY
[de-identified] : Mr. Guzmán is a 77-year-old male who presented to the office for follow up of his bilateral knee pain.  Patient has been experiencing bilateral (left greater than right) knee pain for several years, but has worsened over the last few months. Prior x-rays showed significant bilateral knee osteoarthritis.  Examination showed some tenderness over the knees, but otherwise good bilateral knee range of motion. Discussed with the patient the examination and imaging findings.  Discussed with the patient the operative and nonoperative management of knee osteoarthritis, including total knee arthroplasty. Patient would like to continue nonoperative management at this time.  Discussed the nonoperative management of knee osteoarthritis, including physical therapy, anti-inflammatories, and injections.  Patient was given referral for physical therapy.  Patient was given a prescription for meloxicam 15 mg daily for 14 days.  Patient would like a left knee corticosteroid injection today.  Discussed the risks of corticosteroid injections.  Patient was given a left knee corticosteroid injection using aseptic technique.  Patient tolerated the procedure well.  Patient will follow-up in 3 months for reevaluation and management.  Patient understanding and in agreement the plan.  All questions answered.  Plan: -Left knee corticosteroid injection given -Continue physical therapy -Meloxicam 15 mg daily for 14 days -Continue anti-inflammatories as needed -Follow-up for in 3 months for reevaluation and management  Procedure Note: Left knee corticosteroid injection  A Left Knee corticosteroid injection was given today. Risk and benefits of the injection were discussed, including but not limited to infection, fat atrophy, skin discoloration, failure to achieve desired results and elevated blood sugars.  The area was prepped in the usual sterile fashion. The injection was given with 1 cc (40 mg) Methylprednisolone and 4 cc 1% Lidocaine and the patient tolerated it well.   Risk of cortisone injection are minimal but present. There is the rare risk of joint infection, skin and soft tissue thinning or whitening of the skin surrounding the injection site, thinning of nearby bone, or the risk of elevated blood glucose in diabetics. Diabetics receiving steroid injection should follow their blood sugars very closely for several days after receiving the injections.  In the event of uncontrolled elevated blood glucose, they should seek medical attention.  There's some concern that repeated use of cortisone shots may cause deterioration of the cartilage within a joint. For this reason, doctors typically limit the number of cortisone shots in a joint. The limit varies depending on the joint and the reason for treatment.  Cortisone shots usually include a corticosteroid medication and a local anesthetic. The local anesthetic helps to numb the joint at the time of the injection and last for several hours. The cortisone acts to relieve pain and inflammation but may take several days to begin to work. Some people do experience a cortisone flare after the injection and may have increased pain for 2 to 3 days after the injection, and then pain can begin to improve.  Patient was instructed to call or return for any signs or symptoms of infection after an injection including increased pain, swelling, redness or fevers.

## 2023-11-30 ENCOUNTER — APPOINTMENT (OUTPATIENT)
Dept: ORTHOPEDIC SURGERY | Facility: CLINIC | Age: 78
End: 2023-11-30
Payer: MEDICARE

## 2023-11-30 DIAGNOSIS — M17.0 BILATERAL PRIMARY OSTEOARTHRITIS OF KNEE: ICD-10-CM

## 2023-11-30 PROCEDURE — 99214 OFFICE O/P EST MOD 30 MIN: CPT

## 2023-12-02 PROBLEM — M17.0 PRIMARY OSTEOARTHRITIS OF KNEES, BILATERAL: Status: ACTIVE | Noted: 2023-05-25

## 2024-01-11 ENCOUNTER — APPOINTMENT (OUTPATIENT)
Dept: ORTHOPEDIC SURGERY | Facility: CLINIC | Age: 79
End: 2024-01-11
Payer: MEDICARE

## 2024-01-11 DIAGNOSIS — M17.12 UNILATERAL PRIMARY OSTEOARTHRITIS, LEFT KNEE: ICD-10-CM

## 2024-01-11 PROCEDURE — 99214 OFFICE O/P EST MOD 30 MIN: CPT

## 2024-01-14 PROBLEM — M17.12 PRIMARY OSTEOARTHRITIS OF LEFT KNEE: Status: ACTIVE | Noted: 2023-04-25

## 2024-01-14 NOTE — DISCUSSION/SUMMARY
[de-identified] : Silviano Guzmán is a 77-year-old male who presented to the office for follow up of his bilateral knee pain.  Patient has been experiencing bilateral (left greater than right) knee pain for several years.  He has tried physical therapy, anti-inflammatories, and injections.  Prior x-rays showed severe bilateral knee osteoarthritis.  Examination showed tenderness over the knee, but otherwise good knee range of motion. Discussed with patient the examination and imaging findings.  Discussed with patient the operative and nonoperative management of knee osteoarthritis, including total knee arthroplasty.  Discussed the nonoperative management of knee osteoarthritis, including physical therapy, anti-inflammatories, and injections.  Patient has tried nonoperative management, but continues to have knee pain.  Discussed total knee arthroplasty at length, including surgical procedure, hospital course, DVT prophylaxis, antibiotics, physical therapy, recovery, and risks. Patient would like to proceed with total knee arthroplasty.  Discussed that patient will require medical and cardiac clearance prior to surgery.  Discussed obtaining a CT scan prior to surgery.  Patient will follow-up in 6 weeks for reevaluation and management.  Patient understanding and in agreement with the plan.  All questions answered.   Discussed the imaging and physical exam findings with the patient consistent with endstage knee degenerative disease. The patient has failed conservative management including physical therapy, anti-inflammatories, and injections. The risks, benefits and alternatives to total knee replacement were discussed with the patient in detail and the patient elected to proceed with surgery. Discussed the surgical plan with the patient including implant options and surgical approach.   Surgical risks including fracture requiring fixation, instability or dislocation, temporary or permanent leg length inequality, infection, bleeding, stiffness, failure to alleviate pain, failure to achieve desired results, need for further surgery, scar tissue formation, hardware failure, chronic pain, injury to nerves resulting in extremity dysfunction, injury to arteries and veins, deep vein thrombosis or pulmonary embolism requiring anticoagulation and medical risk factors including heart attack, stroke, death, neurological injury, pneumonia, kidney or other organ failure were discussed with the patient.   Patient was understanding and in agreement with the treatment plan. All questions answered.  Plan: -CT Left Lower Extremity -Medical Clearance -Cardiac Clearance -Left total knee arthroplasty  Surgical Plan: Diagnosis: Left Knee Osteoarthritis Laterality: Left Operative procedure: Left Total Knee Arthroplasty Location: [facility]  DVT prophylaxis: Aspirin 325mg twice per day TXA: IV Plan for discharge: Home  Pre- & Post Operative Antibiotics: Cefazolin 2g  Clearances:      Medical: Pending      Cardiac: Pending  Comorbidities:      Metal Allergy: Negative      Chronic Pain: Negative      Diabetes: Positive, Last A1C: Pending      Use of Anticoagulation: Aspirin      Atrial Fibrillation: Negative      History of VTE: Negative      History of Cardiac Stents: CAD s/p cath, no stent      Skin Infections/Open Wounds: Negative      MRSA Infection/Colonization: Negative      Current Urinary Symptoms: Negative      Immunocompromise: Negative      Inflammatory Arthritis: Negative      Smoking: Negative      Drug Use: Negative      Alcohol Use: Occasional      Obstructive Sleep Apnea: Positive      Neurologic Disease: Negative

## 2024-01-14 NOTE — PHYSICAL EXAM
[de-identified] : Constitutional:  78 year old male, alert and oriented, cooperative, in no acute distress.  HEENT  NC/AT.  Appearance: symmetric  Chest/Respiratory  Respiratory effort: no intercostal retractions or use of accessory muscles. Nonlabored Breathing  Mental Status:  Judgment, insight: intact Orientation: oriented to time, place, and person  Left Knee  Inspection:     Skin intact, no rashes or lesions     No Effusion    Tenderness over the medial and lateral joint lines  Range of Motion: 	Extension - -5 degrees 	Flexion - 120 degrees         Alignment: Varus 3 degrees 	Extensor lag: None  Stability:      Demonstrates no Varus or Valgus instability      Negative Anterior or Posterior drawer.      Negative Lachman's  Patella: stable, tracks well.   Right Knee  Inspection:     Skin intact, no rashes or lesions     No Effusion     Non-tender to palpation over tibial tubercle, medial and lateral joint line, and pes insertion.  Range of Motion: 	Extension - 0 degrees 	Flexion - 120 degrees 	Alignment - Varus 3 degrees 	Extensor lag: None  Stability:      Demonstrates no Varus or Valgus instability      Negative Anterior or Posterior drawer.      Negative Lachman's  Patella: stable, tracks well.   Neurologic Exam     Motor intact including 5/5 Extensor Hallucis Longus, 5/5 Flexor Hallucis Longus, 5/5 Tibialis Anterior and 5/5 Gastrocnemius     Sensation Intact to Light Touch including Saphenous, Sural, Superficial Peroneal, Deep Peroneal, Tibial nerve distributions  Vascular Exam     Foot is warm and well perfused with 2+ Dorsalis Pedis Pulse   No pain with range of motion of the bilateral hips. [de-identified] : XRay:  XRays of the Pelvis (1 View) taken on 3/31/2023. XRays demonstrate no obvious fracture or dislocation. There is joint space narrowing in the bilateral hips. (my personal interpretation).   XRay: XRays of the Right Knee (4 Views) taken on 8/29/2023. XRays demonstrate tricompartmental joint space narrowing with bone on bone articulations, with subchondral sclerosis, overlying osteophytes, all consistent with severe osteoarthritis, KL thGthrthathdtheth:th th5th. There appears to be a healed prior distal femur fracture. (my personal interpretation)   XRay: XRays of the Left Knee (4 Views) taken on 8/29/2023. XRays demonstrate tricompartmental joint space narrowing, worse in the medial compartment, with subchondral sclerosis, overlying osteophytes, all consistent with severe osteoarthritis, KL thGthrthathdtheth:th th4th. There is varus alignment. (my personal interpretation)

## 2024-01-14 NOTE — HISTORY OF PRESENT ILLNESS
[de-identified] : 1/11/2024 [facility]  ID: [id number]  Silviano Guzmán presents to the office for follow-up of his bilateral knee pain.  Patient continues to have bilateral (left greater than right) knee pain.  Pain can cause difficulty walking.  He has tried Tylenol and meloxicam.  Patient tried prior injections.  No falls.  No fevers or chills.  11/30/2023 [facility]  ID: [id number]  Silviano Guzmán presented to the office for follow-up of his bilateral knee pain.  Patient states that he has experienced continued bilateral knee pain (left greater than right.  He states that he has pain with walking.  Pain is worse over the medial knee.  Patient has tried physical therapy.  He has tried Tylenol and meloxicam, with some relief.  He states that the prior injection helped for about 1 to 2 weeks.  No fevers or chills.  No falls or injuries.  8/29/2023 [facility]  ID: [id number]  Silviano Guzmán presents to the office for follow-up of his bilateral knee pain.  Patient continues to have bilateral (left greater than right) knee pain.  He reports difficulty with walking.  Prior viscosupplementation injections only helped for about 2 weeks.  Pain is worse with laying down and with walking.  He has tried multiple injections previously.  He has tried Tylenol and diclofenac, which gives some relief.  He has tried physical therapy, which gives temporary relief.  Patient underwent recent lumbar injection on 8/18, which helped with his back pain.  He can have some lateral hip pain.  No groin pain.  5/25/2023 [facility]  ID: [id number]  Silviano Guzmán presents to the office for follow-up of his bilateral knee pain.  Patient continues to have some bilateral knee pain.  He tolerated the injections well.  His pain has somewhat improved following viscosupplementation injection .  No fevers or chills.  Patient presents for his third bilateral knee viscosupplementation injections.   5/18/2023 [facility]  ID: [id number]  Silviano Guzmán presents to the office for follow-up of his bilateral knee pain.  Patient continues to have some bilateral knee pain.  He tolerated the injections well.  His pain has somewhat improved following viscosupplementation injection .  No fevers or chills.  Patient presents for his second bilateral knee viscosupplementation injections.   5/11/2023  Mr. Guzmán presented to the office for follow up of his bilateral knee pain. Patient continues to have bilateral knee pain (left greater than right). He reports that his pain is worse after drinking alcohol (patient has a history of gout). There have been no falls or injuries. No fevers or chills. Patient presents for viscosupplementation injections.  4/25/2023  Mr. Guzmán is a 77-year-old male who presents to the office for follow-up of his bilateral knee pain.  Patient has been experiencing bilateral (left greater than right) knee pain for several years.  He was previously seen in the office and referred to physical therapy.  He has been in physical therapy, which helps while he is at physical therapy, but his pain returns when he goes home.  He states that overall, his pain is unchanged.  Pain is located over the anterior knee and radiates down the leg to the foot.  He underwent recent lumbar spine MRI by Dr. Barker.  No numbness or tingling.  He does have some tenderness over the knee and lower leg.  Patient has previously tried diclofenac and multiple injections for his knee pain.  3/31/2023 Mr. Guzmán is a 77-year-old male who presents to the office for evaluation of his bilateral knee pain.  Patient has been experiencing bilateral (left greater than right) knee pain for several years, but has been worsening over the last month.  He states that he has significant pain in that it can affect his sleep.  Pain is located over the medial, lateral, and anterior knees bilaterally.  Pain can radiate to the foot.  He also reports some crepitus.  Pain is worse at night and with walking.  He has not noted any relieving factors at this time.  He has tried diclofenac and Tylenol, without significant relief.  Patient has tried physical therapy, last about 1 month ago.  He has tried injections, last on 1/25/2023.  Patient was getting injections every 6 months with about 7 injections in the left knee and 2 injections in the right knee.  His last injection helped for about 2 to 3 weeks.  He is also previously had a knee aspiration, which helped.  Pain is now affecting his quality of life and causing difficulty sleeping and resting.  Patient has been using a cane for the last 3 to 4 years.  He does have a history of lumbar radiculopathy and was referred by his primary care physician to pain management for potential epidural injections.  Patient also has a history of a right knee fracture in 1980 that was treated nonoperatively.  History: CAD s/p cath, CHF, HTN, HLD, ASHOK, BPH, Gout, Lumbar Radiculopathy, Diabetes

## 2024-01-25 ENCOUNTER — OUTPATIENT (OUTPATIENT)
Dept: OUTPATIENT SERVICES | Facility: HOSPITAL | Age: 79
LOS: 1 days | End: 2024-01-25

## 2024-01-25 VITALS
HEART RATE: 97 BPM | SYSTOLIC BLOOD PRESSURE: 122 MMHG | RESPIRATION RATE: 16 BRPM | DIASTOLIC BLOOD PRESSURE: 71 MMHG | HEIGHT: 68 IN | OXYGEN SATURATION: 97 % | TEMPERATURE: 97 F | WEIGHT: 235.01 LBS

## 2024-01-25 DIAGNOSIS — N40.0 BENIGN PROSTATIC HYPERPLASIA WITHOUT LOWER URINARY TRACT SYMPTOMS: ICD-10-CM

## 2024-01-25 DIAGNOSIS — G62.9 POLYNEUROPATHY, UNSPECIFIED: ICD-10-CM

## 2024-01-25 DIAGNOSIS — M17.11 UNILATERAL PRIMARY OSTEOARTHRITIS, RIGHT KNEE: ICD-10-CM

## 2024-01-25 DIAGNOSIS — I10 ESSENTIAL (PRIMARY) HYPERTENSION: ICD-10-CM

## 2024-01-25 DIAGNOSIS — G47.33 OBSTRUCTIVE SLEEP APNEA (ADULT) (PEDIATRIC): ICD-10-CM

## 2024-01-25 DIAGNOSIS — M17.12 UNILATERAL PRIMARY OSTEOARTHRITIS, LEFT KNEE: ICD-10-CM

## 2024-01-25 DIAGNOSIS — I50.9 HEART FAILURE, UNSPECIFIED: ICD-10-CM

## 2024-01-25 DIAGNOSIS — E11.9 TYPE 2 DIABETES MELLITUS WITHOUT COMPLICATIONS: ICD-10-CM

## 2024-01-25 LAB
A1C WITH ESTIMATED AVERAGE GLUCOSE RESULT: 6.6 % — HIGH (ref 4–5.6)
ANION GAP SERPL CALC-SCNC: 14 MMOL/L — SIGNIFICANT CHANGE UP (ref 7–14)
APPEARANCE UR: CLEAR — SIGNIFICANT CHANGE UP
BILIRUB UR-MCNC: NEGATIVE — SIGNIFICANT CHANGE UP
BLD GP AB SCN SERPL QL: NEGATIVE — SIGNIFICANT CHANGE UP
BUN SERPL-MCNC: 11 MG/DL — SIGNIFICANT CHANGE UP (ref 7–23)
CALCIUM SERPL-MCNC: 9.2 MG/DL — SIGNIFICANT CHANGE UP (ref 8.4–10.5)
CHLORIDE SERPL-SCNC: 101 MMOL/L — SIGNIFICANT CHANGE UP (ref 98–107)
CO2 SERPL-SCNC: 23 MMOL/L — SIGNIFICANT CHANGE UP (ref 22–31)
COLOR SPEC: YELLOW — SIGNIFICANT CHANGE UP
CREAT SERPL-MCNC: 0.88 MG/DL — SIGNIFICANT CHANGE UP (ref 0.5–1.3)
DIFF PNL FLD: NEGATIVE — SIGNIFICANT CHANGE UP
EGFR: 89 ML/MIN/1.73M2 — SIGNIFICANT CHANGE UP
ESTIMATED AVERAGE GLUCOSE: 143 — SIGNIFICANT CHANGE UP
GLUCOSE SERPL-MCNC: 117 MG/DL — HIGH (ref 70–99)
GLUCOSE UR QL: >=1000 MG/DL
HCT VFR BLD CALC: 42 % — SIGNIFICANT CHANGE UP (ref 39–50)
HGB BLD-MCNC: 13 G/DL — SIGNIFICANT CHANGE UP (ref 13–17)
KETONES UR-MCNC: NEGATIVE MG/DL — SIGNIFICANT CHANGE UP
LEUKOCYTE ESTERASE UR-ACNC: NEGATIVE — SIGNIFICANT CHANGE UP
MCHC RBC-ENTMCNC: 27 PG — SIGNIFICANT CHANGE UP (ref 27–34)
MCHC RBC-ENTMCNC: 31 GM/DL — LOW (ref 32–36)
MCV RBC AUTO: 87.3 FL — SIGNIFICANT CHANGE UP (ref 80–100)
NITRITE UR-MCNC: NEGATIVE — SIGNIFICANT CHANGE UP
NRBC # BLD: 0 /100 WBCS — SIGNIFICANT CHANGE UP (ref 0–0)
NRBC # FLD: 0 K/UL — SIGNIFICANT CHANGE UP (ref 0–0)
PH UR: 7 — SIGNIFICANT CHANGE UP (ref 5–8)
PLATELET # BLD AUTO: 137 K/UL — LOW (ref 150–400)
POTASSIUM SERPL-MCNC: 3.9 MMOL/L — SIGNIFICANT CHANGE UP (ref 3.5–5.3)
POTASSIUM SERPL-SCNC: 3.9 MMOL/L — SIGNIFICANT CHANGE UP (ref 3.5–5.3)
PROT UR-MCNC: NEGATIVE MG/DL — SIGNIFICANT CHANGE UP
RBC # BLD: 4.81 M/UL — SIGNIFICANT CHANGE UP (ref 4.2–5.8)
RBC # FLD: 13.8 % — SIGNIFICANT CHANGE UP (ref 10.3–14.5)
RH IG SCN BLD-IMP: POSITIVE — SIGNIFICANT CHANGE UP
RH IG SCN BLD-IMP: POSITIVE — SIGNIFICANT CHANGE UP
SODIUM SERPL-SCNC: 138 MMOL/L — SIGNIFICANT CHANGE UP (ref 135–145)
SP GR SPEC: 1.01 — SIGNIFICANT CHANGE UP (ref 1–1.03)
UROBILINOGEN FLD QL: 0.2 MG/DL — SIGNIFICANT CHANGE UP (ref 0.2–1)
WBC # BLD: 5.67 K/UL — SIGNIFICANT CHANGE UP (ref 3.8–10.5)
WBC # FLD AUTO: 5.67 K/UL — SIGNIFICANT CHANGE UP (ref 3.8–10.5)

## 2024-01-25 RX ORDER — CHLORHEXIDINE GLUCONATE 213 G/1000ML
1 SOLUTION TOPICAL DAILY
Refills: 0 | Status: DISCONTINUED | OUTPATIENT
Start: 2024-02-07 | End: 2024-02-12

## 2024-01-25 RX ORDER — SODIUM CHLORIDE 9 MG/ML
1000 INJECTION, SOLUTION INTRAVENOUS
Refills: 0 | Status: DISCONTINUED | OUTPATIENT
Start: 2024-02-07 | End: 2024-02-12

## 2024-01-25 NOTE — H&P PST ADULT - PROBLEM SELECTOR PLAN 2
Patient instructed to hold Metformin the night before and the morning of procedure. Pt stated understanding.     hold Jardiance 3 days prior to procedure

## 2024-01-25 NOTE — H&P PST ADULT - PROBLEM SELECTOR PLAN 1
Patient tentatively scheduled for left arthroplasty    Pre-op instructions provided. Pt given verbal and written instructions with teach back . Pt verbalized understanding with return demonstration.    ordered CBC, BMP, A1c, MRSA, Type, Re type, UA, urine culture

## 2024-01-25 NOTE — H&P PST ADULT - MUSCULOSKELETAL
details… normal/ROM intact/normal gait/strength 5/5 bilateral upper extremities/strength 5/5 bilateral lower extremities left knee/decreased ROM due to pain/decreased strength/abnormal gait

## 2024-01-25 NOTE — H&P PST ADULT - FUNCTIONAL STATUS
Patient does limited activities due knee pain, however patient used to walk 3 blocks daily x 2/4-10 METS Patient does limited activities due knee pain, however 6 months prior patient used to walk 3 blocks daily x 2/4-10 METS

## 2024-01-25 NOTE — H&P PST ADULT - ATTENDING COMMENTS
Silviano Guzmán is a 78 year old male, past medical history of CAD status post catheterization, CHF, Hypertension, Hyperlipidemia, ASHOK, BPH, Gout, Lumbar Radiculopathy, Diabetes, who presented to the office for evaluation of his left knee pain. Patient has been experiencing bilateral (left greater than right) knee pain. Pain is located over the medial, lateral, and anterior knee. Pain is worse at night and with walking. Patient has tried Diclofenac, Physical Therapy, and Injections. Pain is affecting his quality of life. XRays showed severe left knee osteoarthritis. Patient was indicated for a Left Total Knee Arthroplasty. He was cleared by medicine and cardiology.    Discussed the operative and nonoperative management of knee osteoarthritis at length with the patient. Nonoperative management would consist of pain control, physical therapy, and anti-inflammatories. The patient had failed conservative management, including physical therapy, anti-inflammatories, and injections. The patient did not want to continue nonoperative management due to the impact knee pain has had on the patient’s quality of life. Operative management would consist of total knee arthroplasty. The risks, benefits and alternatives to total knee arthroplasty were discussed with the patient in detail and the patient elected to proceed with surgery. Discussed the surgical plan and implants with the patient, including robotic assisted total knee arthroplasty. Discussed the recovery process following total knee arthroplasty at length, including, but not limited to, inpatient hospital stay, physical therapy, recovery, antibiotics, and DVT prophylaxis. Surgical risks including fracture requiring fixation, instability or dislocation, temporary or permanent leg length inequality, risks of cementation, infection, bleeding, stiffness, failure to alleviate pain, failure to achieve desired results, need for further surgery, scar tissue formation, hardware failure, component loosening, chronic pain, injury to nerves resulting in extremity dysfunction, injury to arteries and veins, deep vein thrombosis or pulmonary embolism requiring anticoagulation and medical risk factors including heart attack, stroke, death, neurological injury, pneumonia, kidney or other organ failure were discussed with the patient at length.    Following discussion, patient elected to proceed with Left Total Knee Arthroplasty with ANDRES Robotic Assistance. Informed consent was obtained. Patient's leg was then marked with verbal confirmation of the patient. Patient was understanding and in agreement with the operative plan. All questions were answered.    Assessment/Plan:  Silviano Guzmán is a 78 year old male who presented for a Left Total Knee Arthroplasty with ANDRES Robotic Assistance    Plan:  -Left Total Knee Arthroplasty with ANDRES Robotic Assistance  -Clearance in Chart

## 2024-01-25 NOTE — H&P PST ADULT - PROBLEM SELECTOR PLAN 3
Patient instructed to take amlodipine, isorbide and metoprolol with a sip of water on the morning of procedure.

## 2024-01-25 NOTE — H&P PST ADULT - HISTORY OF PRESENT ILLNESS
patient is  patient is 78ear old male with hx DM, CHF, HTN, presented to PST with pre op dx of unilateral primary osteoarthritis left knee, patient is scheduled for left total knee arthroscopy with jennifer

## 2024-01-25 NOTE — H&P PST ADULT - PROBLEM SELECTOR PLAN 4
Patient instructed to take Entresto, furosemide  and metoprolol a sip of water on the morning of procedure.     copy Cardiac eval in the chart

## 2024-01-25 NOTE — H&P PST ADULT - NSICDXPASTMEDICALHX_GEN_ALL_CORE_FT
PAST MEDICAL HISTORY:  Asthma     Bilateral hearing loss     BPH (benign prostatic hyperplasia)     CAD (coronary artery disease)     Chronic lumbar radiculopathy     Chronic systolic congestive heart failure     DM (diabetes mellitus)     Dry eyes     GERD (gastroesophageal reflux disease)     Gout     Hyperlipidemia     Hypertension     ASHOK (obstructive sleep apnea)     Osteoarthritis     Seasonal allergies

## 2024-01-25 NOTE — H&P PST ADULT - NECK
FULL ROM of NEck/normal/supple/symmetric/no tracheal deviation/no thyromegaly/no palpable masses FULL ROM of Neck/normal/supple/symmetric/no tracheal deviation/no thyromegaly/no palpable masses

## 2024-01-25 NOTE — H&P PST ADULT - LOCATION OF BACK PAIN
please elevate the foot   ICE the toe   take pain medication as prescribed. no not take NSAIDs or ASPIRIN with naproxen   follow with podiatry
lumbar spine

## 2024-01-26 LAB
CULTURE RESULTS: NO GROWTH — SIGNIFICANT CHANGE UP
MRSA PCR RESULT.: SIGNIFICANT CHANGE UP
S AUREUS DNA NOSE QL NAA+PROBE: SIGNIFICANT CHANGE UP
SPECIMEN SOURCE: SIGNIFICANT CHANGE UP

## 2024-02-06 ENCOUNTER — APPOINTMENT (OUTPATIENT)
Dept: CT IMAGING | Facility: IMAGING CENTER | Age: 79
End: 2024-02-06
Payer: MEDICARE

## 2024-02-06 ENCOUNTER — OUTPATIENT (OUTPATIENT)
Dept: OUTPATIENT SERVICES | Facility: HOSPITAL | Age: 79
LOS: 1 days | End: 2024-02-06
Payer: MEDICARE

## 2024-02-06 ENCOUNTER — TRANSCRIPTION ENCOUNTER (OUTPATIENT)
Age: 79
End: 2024-02-06

## 2024-02-06 DIAGNOSIS — M17.12 UNILATERAL PRIMARY OSTEOARTHRITIS, LEFT KNEE: ICD-10-CM

## 2024-02-06 PROCEDURE — 73700 CT LOWER EXTREMITY W/O DYE: CPT

## 2024-02-06 PROCEDURE — 73700 CT LOWER EXTREMITY W/O DYE: CPT | Mod: 26,LT

## 2024-02-06 NOTE — ASU PATIENT PROFILE, ADULT - FALL HARM RISK - RISK INTERVENTIONS

## 2024-02-07 ENCOUNTER — APPOINTMENT (OUTPATIENT)
Dept: ORTHOPEDIC SURGERY | Facility: HOSPITAL | Age: 79
End: 2024-02-07

## 2024-02-07 ENCOUNTER — INPATIENT (INPATIENT)
Facility: HOSPITAL | Age: 79
LOS: 4 days | Discharge: HOME HEALTH | End: 2024-02-12
Attending: STUDENT IN AN ORGANIZED HEALTH CARE EDUCATION/TRAINING PROGRAM | Admitting: STUDENT IN AN ORGANIZED HEALTH CARE EDUCATION/TRAINING PROGRAM
Payer: MEDICARE

## 2024-02-07 VITALS
OXYGEN SATURATION: 100 % | TEMPERATURE: 98 F | DIASTOLIC BLOOD PRESSURE: 63 MMHG | HEART RATE: 63 BPM | SYSTOLIC BLOOD PRESSURE: 124 MMHG | WEIGHT: 235.01 LBS | HEIGHT: 68 IN | RESPIRATION RATE: 16 BRPM

## 2024-02-07 DIAGNOSIS — M17.12 UNILATERAL PRIMARY OSTEOARTHRITIS, LEFT KNEE: ICD-10-CM

## 2024-02-07 LAB
GLUCOSE BLDC GLUCOMTR-MCNC: 109 MG/DL — HIGH (ref 70–99)
GLUCOSE BLDC GLUCOMTR-MCNC: 142 MG/DL — HIGH (ref 70–99)
GLUCOSE BLDC GLUCOMTR-MCNC: 160 MG/DL — HIGH (ref 70–99)
GLUCOSE BLDC GLUCOMTR-MCNC: 208 MG/DL — HIGH (ref 70–99)

## 2024-02-07 PROCEDURE — 0055T BONE SRGRY CMPTR CT/MRI IMAG: CPT

## 2024-02-07 PROCEDURE — 27447 TOTAL KNEE ARTHROPLASTY: CPT | Mod: LT

## 2024-02-07 PROCEDURE — 73560 X-RAY EXAM OF KNEE 1 OR 2: CPT | Mod: 26,LT

## 2024-02-07 DEVICE — MAKO BONE PIN 4MM X 140MM: Type: IMPLANTABLE DEVICE | Site: LEFT | Status: FUNCTIONAL

## 2024-02-07 DEVICE — BASEPLATE TIB UNIV TRIATHLON SZ 6: Type: IMPLANTABLE DEVICE | Site: LEFT | Status: FUNCTIONAL

## 2024-02-07 DEVICE — CEMENT SIMPLEX P 40GM: Type: IMPLANTABLE DEVICE | Site: LEFT | Status: FUNCTIONAL

## 2024-02-07 DEVICE — COMP FEM TRIATHLON CR SZ 6 LT: Type: IMPLANTABLE DEVICE | Site: LEFT | Status: FUNCTIONAL

## 2024-02-07 DEVICE — MAKO BONE PIN 4MM X 110MM: Type: IMPLANTABLE DEVICE | Site: LEFT | Status: FUNCTIONAL

## 2024-02-07 DEVICE — INSERT TIB BEARING CS X3 SZ 6 11MM: Type: IMPLANTABLE DEVICE | Site: LEFT | Status: FUNCTIONAL

## 2024-02-07 DEVICE — IMP PATELLA ASYMMETRIC X3 35X10MM: Type: IMPLANTABLE DEVICE | Site: LEFT | Status: FUNCTIONAL

## 2024-02-07 RX ORDER — ONDANSETRON 8 MG/1
4 TABLET, FILM COATED ORAL EVERY 6 HOURS
Refills: 0 | Status: DISCONTINUED | OUTPATIENT
Start: 2024-02-07 | End: 2024-02-12

## 2024-02-07 RX ORDER — EMPAGLIFLOZIN 10 MG/1
1 TABLET, FILM COATED ORAL
Refills: 0 | DISCHARGE

## 2024-02-07 RX ORDER — DEXTROSE 50 % IN WATER 50 %
25 SYRINGE (ML) INTRAVENOUS ONCE
Refills: 0 | Status: DISCONTINUED | OUTPATIENT
Start: 2024-02-07 | End: 2024-02-12

## 2024-02-07 RX ORDER — METOPROLOL TARTRATE 50 MG
100 TABLET ORAL DAILY
Refills: 0 | Status: DISCONTINUED | OUTPATIENT
Start: 2024-02-07 | End: 2024-02-12

## 2024-02-07 RX ORDER — BUDESONIDE AND FORMOTEROL FUMARATE DIHYDRATE 160; 4.5 UG/1; UG/1
2 AEROSOL RESPIRATORY (INHALATION)
Refills: 0 | Status: DISCONTINUED | OUTPATIENT
Start: 2024-02-07 | End: 2024-02-12

## 2024-02-07 RX ORDER — GABAPENTIN 400 MG/1
1 CAPSULE ORAL
Refills: 0 | DISCHARGE

## 2024-02-07 RX ORDER — ACETAMINOPHEN 500 MG
975 TABLET ORAL EVERY 8 HOURS
Refills: 0 | Status: DISCONTINUED | OUTPATIENT
Start: 2024-02-08 | End: 2024-02-12

## 2024-02-07 RX ORDER — ATORVASTATIN CALCIUM 80 MG/1
80 TABLET, FILM COATED ORAL AT BEDTIME
Refills: 0 | Status: DISCONTINUED | OUTPATIENT
Start: 2024-02-07 | End: 2024-02-12

## 2024-02-07 RX ORDER — COLCHICINE 0.6 MG
0.6 TABLET ORAL DAILY
Refills: 0 | Status: DISCONTINUED | OUTPATIENT
Start: 2024-02-07 | End: 2024-02-12

## 2024-02-07 RX ORDER — OXYCODONE HYDROCHLORIDE 5 MG/1
5 TABLET ORAL ONCE
Refills: 0 | Status: DISCONTINUED | OUTPATIENT
Start: 2024-02-07 | End: 2024-02-07

## 2024-02-07 RX ORDER — SACUBITRIL AND VALSARTAN 24; 26 MG/1; MG/1
1 TABLET, FILM COATED ORAL
Refills: 0 | Status: DISCONTINUED | OUTPATIENT
Start: 2024-02-07 | End: 2024-02-12

## 2024-02-07 RX ORDER — TRAMADOL HYDROCHLORIDE 50 MG/1
50 TABLET ORAL EVERY 6 HOURS
Refills: 0 | Status: DISCONTINUED | OUTPATIENT
Start: 2024-02-07 | End: 2024-02-12

## 2024-02-07 RX ORDER — SITAGLIPTIN AND METFORMIN HYDROCHLORIDE 500; 50 MG/1; MG/1
1 TABLET, FILM COATED ORAL
Refills: 0 | DISCHARGE

## 2024-02-07 RX ORDER — ALBUTEROL 90 UG/1
2 AEROSOL, METERED ORAL
Refills: 0 | DISCHARGE

## 2024-02-07 RX ORDER — GLIMEPIRIDE 1 MG
1 TABLET ORAL
Refills: 0 | DISCHARGE

## 2024-02-07 RX ORDER — ALLOPURINOL 300 MG
100 TABLET ORAL DAILY
Refills: 0 | Status: DISCONTINUED | OUTPATIENT
Start: 2024-02-07 | End: 2024-02-12

## 2024-02-07 RX ORDER — ACETAMINOPHEN 500 MG
1000 TABLET ORAL ONCE
Refills: 0 | Status: DISCONTINUED | OUTPATIENT
Start: 2024-02-07 | End: 2024-02-12

## 2024-02-07 RX ORDER — FUROSEMIDE 40 MG
20 TABLET ORAL
Refills: 0 | Status: DISCONTINUED | OUTPATIENT
Start: 2024-02-07 | End: 2024-02-12

## 2024-02-07 RX ORDER — AMLODIPINE BESYLATE 2.5 MG/1
1 TABLET ORAL
Refills: 0 | DISCHARGE

## 2024-02-07 RX ORDER — ISOSORBIDE MONONITRATE 60 MG/1
60 TABLET, EXTENDED RELEASE ORAL DAILY
Refills: 0 | Status: DISCONTINUED | OUTPATIENT
Start: 2024-02-07 | End: 2024-02-12

## 2024-02-07 RX ORDER — ISOSORBIDE MONONITRATE 60 MG/1
1 TABLET, EXTENDED RELEASE ORAL
Refills: 0 | DISCHARGE

## 2024-02-07 RX ORDER — TRAMADOL HYDROCHLORIDE 50 MG/1
50 TABLET ORAL ONCE
Refills: 0 | Status: DISCONTINUED | OUTPATIENT
Start: 2024-02-07 | End: 2024-02-07

## 2024-02-07 RX ORDER — ACETAMINOPHEN 500 MG
1000 TABLET ORAL ONCE
Refills: 0 | Status: COMPLETED | OUTPATIENT
Start: 2024-02-08 | End: 2024-02-08

## 2024-02-07 RX ORDER — HYDROMORPHONE HYDROCHLORIDE 2 MG/ML
0.5 INJECTION INTRAMUSCULAR; INTRAVENOUS; SUBCUTANEOUS ONCE
Refills: 0 | Status: DISCONTINUED | OUTPATIENT
Start: 2024-02-07 | End: 2024-02-12

## 2024-02-07 RX ORDER — MONTELUKAST 4 MG/1
10 TABLET, CHEWABLE ORAL DAILY
Refills: 0 | Status: DISCONTINUED | OUTPATIENT
Start: 2024-02-07 | End: 2024-02-12

## 2024-02-07 RX ORDER — ATORVASTATIN CALCIUM 80 MG/1
1 TABLET, FILM COATED ORAL
Refills: 0 | DISCHARGE

## 2024-02-07 RX ORDER — PANTOPRAZOLE SODIUM 20 MG/1
40 TABLET, DELAYED RELEASE ORAL ONCE
Refills: 0 | Status: COMPLETED | OUTPATIENT
Start: 2024-02-07 | End: 2024-02-07

## 2024-02-07 RX ORDER — SENNA PLUS 8.6 MG/1
2 TABLET ORAL AT BEDTIME
Refills: 0 | Status: DISCONTINUED | OUTPATIENT
Start: 2024-02-07 | End: 2024-02-12

## 2024-02-07 RX ORDER — SODIUM CHLORIDE 9 MG/ML
1000 INJECTION, SOLUTION INTRAVENOUS
Refills: 0 | Status: DISCONTINUED | OUTPATIENT
Start: 2024-02-07 | End: 2024-02-12

## 2024-02-07 RX ORDER — COLCHICINE 0.6 MG
1 TABLET ORAL
Refills: 0 | DISCHARGE

## 2024-02-07 RX ORDER — AZELASTINE 137 UG/1
2 SPRAY, METERED NASAL
Refills: 0 | DISCHARGE

## 2024-02-07 RX ORDER — ONDANSETRON 8 MG/1
4 TABLET, FILM COATED ORAL ONCE
Refills: 0 | Status: DISCONTINUED | OUTPATIENT
Start: 2024-02-07 | End: 2024-02-07

## 2024-02-07 RX ORDER — GABAPENTIN 400 MG/1
800 CAPSULE ORAL THREE TIMES A DAY
Refills: 0 | Status: DISCONTINUED | OUTPATIENT
Start: 2024-02-07 | End: 2024-02-12

## 2024-02-07 RX ORDER — FUROSEMIDE 40 MG
1 TABLET ORAL
Refills: 0 | DISCHARGE

## 2024-02-07 RX ORDER — OXYCODONE HYDROCHLORIDE 5 MG/1
5 TABLET ORAL EVERY 4 HOURS
Refills: 0 | Status: DISCONTINUED | OUTPATIENT
Start: 2024-02-07 | End: 2024-02-12

## 2024-02-07 RX ORDER — ALBUTEROL 90 UG/1
2 AEROSOL, METERED ORAL EVERY 6 HOURS
Refills: 0 | Status: DISCONTINUED | OUTPATIENT
Start: 2024-02-07 | End: 2024-02-12

## 2024-02-07 RX ORDER — HYDROMORPHONE HYDROCHLORIDE 2 MG/ML
0.5 INJECTION INTRAMUSCULAR; INTRAVENOUS; SUBCUTANEOUS
Refills: 0 | Status: DISCONTINUED | OUTPATIENT
Start: 2024-02-07 | End: 2024-02-07

## 2024-02-07 RX ORDER — MONTELUKAST 4 MG/1
1 TABLET, CHEWABLE ORAL
Refills: 0 | DISCHARGE

## 2024-02-07 RX ORDER — OXYCODONE HYDROCHLORIDE 5 MG/1
10 TABLET ORAL EVERY 4 HOURS
Refills: 0 | Status: DISCONTINUED | OUTPATIENT
Start: 2024-02-07 | End: 2024-02-12

## 2024-02-07 RX ORDER — CEFAZOLIN SODIUM 1 G
2000 VIAL (EA) INJECTION EVERY 8 HOURS
Refills: 0 | Status: COMPLETED | OUTPATIENT
Start: 2024-02-07 | End: 2024-02-08

## 2024-02-07 RX ORDER — ASPIRIN/CALCIUM CARB/MAGNESIUM 324 MG
325 TABLET ORAL
Refills: 0 | Status: DISCONTINUED | OUTPATIENT
Start: 2024-02-07 | End: 2024-02-09

## 2024-02-07 RX ORDER — INSULIN LISPRO 100/ML
VIAL (ML) SUBCUTANEOUS
Refills: 0 | Status: DISCONTINUED | OUTPATIENT
Start: 2024-02-07 | End: 2024-02-12

## 2024-02-07 RX ORDER — CETIRIZINE HYDROCHLORIDE 10 MG/1
1 TABLET ORAL
Refills: 0 | DISCHARGE

## 2024-02-07 RX ORDER — DEXTROSE 50 % IN WATER 50 %
15 SYRINGE (ML) INTRAVENOUS ONCE
Refills: 0 | Status: DISCONTINUED | OUTPATIENT
Start: 2024-02-07 | End: 2024-02-12

## 2024-02-07 RX ORDER — PANTOPRAZOLE SODIUM 20 MG/1
40 TABLET, DELAYED RELEASE ORAL
Refills: 0 | Status: DISCONTINUED | OUTPATIENT
Start: 2024-02-07 | End: 2024-02-12

## 2024-02-07 RX ORDER — SACUBITRIL AND VALSARTAN 24; 26 MG/1; MG/1
1 TABLET, FILM COATED ORAL
Refills: 0 | DISCHARGE

## 2024-02-07 RX ORDER — POLYETHYLENE GLYCOL 3350 17 G/17G
17 POWDER, FOR SOLUTION ORAL AT BEDTIME
Refills: 0 | Status: DISCONTINUED | OUTPATIENT
Start: 2024-02-07 | End: 2024-02-12

## 2024-02-07 RX ORDER — TAMSULOSIN HYDROCHLORIDE 0.4 MG/1
0.4 CAPSULE ORAL AT BEDTIME
Refills: 0 | Status: DISCONTINUED | OUTPATIENT
Start: 2024-02-07 | End: 2024-02-12

## 2024-02-07 RX ORDER — ISOSORBIDE MONONITRATE 60 MG/1
30 TABLET, EXTENDED RELEASE ORAL DAILY
Refills: 0 | Status: DISCONTINUED | OUTPATIENT
Start: 2024-02-07 | End: 2024-02-12

## 2024-02-07 RX ORDER — TAMSULOSIN HYDROCHLORIDE 0.4 MG/1
2 CAPSULE ORAL
Refills: 0 | DISCHARGE

## 2024-02-07 RX ORDER — SODIUM CHLORIDE 9 MG/ML
500 INJECTION, SOLUTION INTRAVENOUS ONCE
Refills: 0 | Status: COMPLETED | OUTPATIENT
Start: 2024-02-07 | End: 2024-02-07

## 2024-02-07 RX ORDER — DEXTROSE 50 % IN WATER 50 %
12.5 SYRINGE (ML) INTRAVENOUS ONCE
Refills: 0 | Status: DISCONTINUED | OUTPATIENT
Start: 2024-02-07 | End: 2024-02-12

## 2024-02-07 RX ORDER — AMLODIPINE BESYLATE 2.5 MG/1
10 TABLET ORAL DAILY
Refills: 0 | Status: DISCONTINUED | OUTPATIENT
Start: 2024-02-07 | End: 2024-02-12

## 2024-02-07 RX ORDER — INSULIN LISPRO 100/ML
VIAL (ML) SUBCUTANEOUS AT BEDTIME
Refills: 0 | Status: DISCONTINUED | OUTPATIENT
Start: 2024-02-07 | End: 2024-02-12

## 2024-02-07 RX ORDER — METOPROLOL TARTRATE 50 MG
1 TABLET ORAL
Refills: 0 | DISCHARGE

## 2024-02-07 RX ORDER — GLUCAGON INJECTION, SOLUTION 0.5 MG/.1ML
1 INJECTION, SOLUTION SUBCUTANEOUS ONCE
Refills: 0 | Status: DISCONTINUED | OUTPATIENT
Start: 2024-02-07 | End: 2024-02-12

## 2024-02-07 RX ORDER — FLUTICASONE PROPIONATE 50 MCG
1 SPRAY, SUSPENSION NASAL
Refills: 0 | DISCHARGE

## 2024-02-07 RX ORDER — ALLOPURINOL 300 MG
1 TABLET ORAL
Refills: 0 | DISCHARGE

## 2024-02-07 RX ORDER — SODIUM CHLORIDE 9 MG/ML
500 INJECTION, SOLUTION INTRAVENOUS ONCE
Refills: 0 | Status: COMPLETED | OUTPATIENT
Start: 2024-02-07 | End: 2024-02-08

## 2024-02-07 RX ORDER — BUDESONIDE AND FORMOTEROL FUMARATE DIHYDRATE 160; 4.5 UG/1; UG/1
2 AEROSOL RESPIRATORY (INHALATION)
Refills: 0 | DISCHARGE

## 2024-02-07 RX ORDER — LORATADINE 10 MG/1
10 TABLET ORAL DAILY
Refills: 0 | Status: DISCONTINUED | OUTPATIENT
Start: 2024-02-07 | End: 2024-02-12

## 2024-02-07 RX ADMIN — Medication 100 MILLIGRAM(S): at 21:24

## 2024-02-07 RX ADMIN — ATORVASTATIN CALCIUM 80 MILLIGRAM(S): 80 TABLET, FILM COATED ORAL at 21:24

## 2024-02-07 RX ADMIN — SODIUM CHLORIDE 500 MILLILITER(S): 9 INJECTION, SOLUTION INTRAVENOUS at 19:33

## 2024-02-07 RX ADMIN — OXYCODONE HYDROCHLORIDE 10 MILLIGRAM(S): 5 TABLET ORAL at 18:00

## 2024-02-07 RX ADMIN — OXYCODONE HYDROCHLORIDE 10 MILLIGRAM(S): 5 TABLET ORAL at 23:49

## 2024-02-07 RX ADMIN — HYDROMORPHONE HYDROCHLORIDE 0.5 MILLIGRAM(S): 2 INJECTION INTRAMUSCULAR; INTRAVENOUS; SUBCUTANEOUS at 16:35

## 2024-02-07 RX ADMIN — CHLORHEXIDINE GLUCONATE 1 APPLICATION(S): 213 SOLUTION TOPICAL at 12:07

## 2024-02-07 RX ADMIN — Medication 325 MILLIGRAM(S): at 17:35

## 2024-02-07 RX ADMIN — HYDROMORPHONE HYDROCHLORIDE 0.5 MILLIGRAM(S): 2 INJECTION INTRAMUSCULAR; INTRAVENOUS; SUBCUTANEOUS at 16:23

## 2024-02-07 RX ADMIN — SENNA PLUS 2 TABLET(S): 8.6 TABLET ORAL at 21:23

## 2024-02-07 RX ADMIN — BUDESONIDE AND FORMOTEROL FUMARATE DIHYDRATE 2 PUFF(S): 160; 4.5 AEROSOL RESPIRATORY (INHALATION) at 21:24

## 2024-02-07 RX ADMIN — SODIUM CHLORIDE 30 MILLILITER(S): 9 INJECTION, SOLUTION INTRAVENOUS at 21:25

## 2024-02-07 RX ADMIN — OXYCODONE HYDROCHLORIDE 10 MILLIGRAM(S): 5 TABLET ORAL at 17:35

## 2024-02-07 RX ADMIN — TRAMADOL HYDROCHLORIDE 50 MILLIGRAM(S): 50 TABLET ORAL at 11:42

## 2024-02-07 RX ADMIN — HYDROMORPHONE HYDROCHLORIDE 0.5 MILLIGRAM(S): 2 INJECTION INTRAMUSCULAR; INTRAVENOUS; SUBCUTANEOUS at 16:45

## 2024-02-07 RX ADMIN — HYDROMORPHONE HYDROCHLORIDE 0.5 MILLIGRAM(S): 2 INJECTION INTRAMUSCULAR; INTRAVENOUS; SUBCUTANEOUS at 16:38

## 2024-02-07 RX ADMIN — POLYETHYLENE GLYCOL 3350 17 GRAM(S): 17 POWDER, FOR SOLUTION ORAL at 21:24

## 2024-02-07 RX ADMIN — SODIUM CHLORIDE 500 MILLILITER(S): 9 INJECTION, SOLUTION INTRAVENOUS at 16:06

## 2024-02-07 RX ADMIN — GABAPENTIN 800 MILLIGRAM(S): 400 CAPSULE ORAL at 21:21

## 2024-02-07 RX ADMIN — SACUBITRIL AND VALSARTAN 1 TABLET(S): 24; 26 TABLET, FILM COATED ORAL at 17:40

## 2024-02-07 RX ADMIN — TAMSULOSIN HYDROCHLORIDE 0.4 MILLIGRAM(S): 0.4 CAPSULE ORAL at 21:24

## 2024-02-07 NOTE — CHART NOTE - NSCHARTNOTEFT_GEN_A_CORE
ORTHOPEDIC POST-OPERATIVE NOTE    Patient is s/p L TKA    Subjective:  Patient reports pain at the incisional site, controlled with medication  Denies chest pain, shortness of breath, nausea, vomiting    Vital Signs Last 24 Hrs  T(C): 36.3 (07 Feb 2024 19:39), Max: 37 (07 Feb 2024 16:00)  T(F): 97.4 (07 Feb 2024 19:39), Max: 98.6 (07 Feb 2024 16:00)  HR: 62 (07 Feb 2024 19:39) (56 - 68)  BP: 125/63 (07 Feb 2024 19:39) (95/46 - 125/63)  BP(mean): 73 (07 Feb 2024 18:00) (57 - 86)  RR: 17 (07 Feb 2024 19:39) (15 - 20)  SpO2: 99% (07 Feb 2024 19:39) (94% - 100%)    Parameters below as of 07 Feb 2024 19:39  Patient On (Oxygen Delivery Method): room air        I&O's Detail    07 Feb 2024 07:01  -  07 Feb 2024 20:59  --------------------------------------------------------  IN:    Lactated Ringers: 90 mL    Lactated Ringers Bolus: 500 mL    Oral Fluid: 360 mL  Total IN: 950 mL    OUT:  Total OUT: 0 mL    Total NET: 950 mL          Physical Exam:  General: NAD, resting comfortably in bed  Pulmonary: Nonlabored breathing, no respiratory distress  Abdominal: nondistended  Extremities:   Left Lower Extremity:  Dressing clean dry intact  5/5 EHL/FHL/TA/GS  SILT L3-S1  +DP/PT Pulses  Compartments soft  No calf TTP B/L      LABS:        Assessment:  The patient is a 78y Male who is now several hours post-op from a L TKA    Plan:  - Pain control as needed  - tolerating regular diet  - DVT ppx:  BID  - OOB and ambulating as tolerated  - F/u AM labs    For all questions related to patient care, please reach out to the on-call team via the pager.     Adelina Jain, PGY 2  Orthopaedic Surgery  [facility] [phone]  [facility] [phone]  [facility] [phone]

## 2024-02-07 NOTE — PATIENT PROFILE ADULT - DO YOU LACK THE NECESSARY SUPPORT TO HELP YOU COPE WITH LIFE CHALLENGES?
no Eliptical Excision Additional Text (Leave Blank If You Do Not Want): The margin was drawn around the clinically apparent lesion.  An elliptical shape was then drawn on the skin incorporating the lesion and margins.  Incisions were then made along these lines to the appropriate tissue plane and the lesion was extirpated.

## 2024-02-07 NOTE — BRIEF OPERATIVE NOTE - VENOUS THROMBOEMBOLISM PROPHYLAXIS THERAPY
Detail Level: Detailed Quality 110: Preventive Care And Screening: Influenza Immunization: Influenza Immunization Administered during Influenza season SCD

## 2024-02-07 NOTE — ASU PREOP CHECKLIST - CHLOROHEXIDINE WASH 1
I will not send ceftin as he is allergic. Will send cipro 500mg twice daily for 10 days. 06-Feb-2024 06:00

## 2024-02-07 NOTE — PATIENT PROFILE ADULT - FALL HARM RISK - HARM RISK INTERVENTIONS
Assistance with ambulation/Assistance OOB with selected safe patient handling equipment/Communicate Risk of Fall with Harm to all staff/Discuss with provider need for PT consult/Monitor gait and stability/Provide patient with walking aids - walker, cane, crutches/Reinforce activity limits and safety measures with patient and family/Sit up slowly, dangle for a short time, stand at bedside before walking/Tailored Fall Risk Interventions/Use of alarms - bed, chair and/or voice tab/Visual Cue: Yellow wristband and red socks/Bed in lowest position, wheels locked, appropriate side rails in place/Call bell, personal items and telephone in reach/Instruct patient to call for assistance before getting out of bed or chair/Non-slip footwear when patient is out of bed/[place] to call system/Physically safe environment - no spills, clutter or unnecessary equipment/Purposeful Proactive Rounding/Room/bathroom lighting operational, light cord in reach

## 2024-02-07 NOTE — BRIEF OPERATIVE NOTE - NSICDXBRIEFPOSTOP_GEN_ALL_CORE_FT
POST-OP DIAGNOSIS:  Unilateral primary osteoarthritis, right knee 07-Feb-2024 16:04:38  Merlin Marquez

## 2024-02-07 NOTE — BRIEF OPERATIVE NOTE - NSICDXBRIEFPREOP_GEN_ALL_CORE_FT
PRE-OP DIAGNOSIS:  Unilateral primary osteoarthritis, right knee 07-Feb-2024 16:04:43  Merlin Marquez

## 2024-02-08 ENCOUNTER — TRANSCRIPTION ENCOUNTER (OUTPATIENT)
Age: 79
End: 2024-02-08

## 2024-02-08 DIAGNOSIS — Z96.651 PRESENCE OF RIGHT ARTIFICIAL KNEE JOINT: ICD-10-CM

## 2024-02-08 DIAGNOSIS — Z29.9 ENCOUNTER FOR PROPHYLACTIC MEASURES, UNSPECIFIED: ICD-10-CM

## 2024-02-08 LAB
ANION GAP SERPL CALC-SCNC: 11 MMOL/L — SIGNIFICANT CHANGE UP (ref 7–14)
BUN SERPL-MCNC: 14 MG/DL — SIGNIFICANT CHANGE UP (ref 7–23)
CALCIUM SERPL-MCNC: 8.5 MG/DL — SIGNIFICANT CHANGE UP (ref 8.4–10.5)
CHLORIDE SERPL-SCNC: 100 MMOL/L — SIGNIFICANT CHANGE UP (ref 98–107)
CO2 SERPL-SCNC: 24 MMOL/L — SIGNIFICANT CHANGE UP (ref 22–31)
CREAT SERPL-MCNC: 1.09 MG/DL — SIGNIFICANT CHANGE UP (ref 0.5–1.3)
EGFR: 70 ML/MIN/1.73M2 — SIGNIFICANT CHANGE UP
GLUCOSE BLDC GLUCOMTR-MCNC: 162 MG/DL — HIGH (ref 70–99)
GLUCOSE BLDC GLUCOMTR-MCNC: 166 MG/DL — HIGH (ref 70–99)
GLUCOSE BLDC GLUCOMTR-MCNC: 177 MG/DL — HIGH (ref 70–99)
GLUCOSE BLDC GLUCOMTR-MCNC: 183 MG/DL — HIGH (ref 70–99)
GLUCOSE SERPL-MCNC: 173 MG/DL — HIGH (ref 70–99)
HCT VFR BLD CALC: 36 % — LOW (ref 39–50)
HGB BLD-MCNC: 11.6 G/DL — LOW (ref 13–17)
MCHC RBC-ENTMCNC: 27.6 PG — SIGNIFICANT CHANGE UP (ref 27–34)
MCHC RBC-ENTMCNC: 32.2 GM/DL — SIGNIFICANT CHANGE UP (ref 32–36)
MCV RBC AUTO: 85.5 FL — SIGNIFICANT CHANGE UP (ref 80–100)
NRBC # BLD: 0 /100 WBCS — SIGNIFICANT CHANGE UP (ref 0–0)
NRBC # FLD: 0 K/UL — SIGNIFICANT CHANGE UP (ref 0–0)
PLATELET # BLD AUTO: 116 K/UL — LOW (ref 150–400)
POTASSIUM SERPL-MCNC: 4.3 MMOL/L — SIGNIFICANT CHANGE UP (ref 3.5–5.3)
POTASSIUM SERPL-SCNC: 4.3 MMOL/L — SIGNIFICANT CHANGE UP (ref 3.5–5.3)
RBC # BLD: 4.21 M/UL — SIGNIFICANT CHANGE UP (ref 4.2–5.8)
RBC # FLD: 14.1 % — SIGNIFICANT CHANGE UP (ref 10.3–14.5)
SODIUM SERPL-SCNC: 135 MMOL/L — SIGNIFICANT CHANGE UP (ref 135–145)
WBC # BLD: 8.01 K/UL — SIGNIFICANT CHANGE UP (ref 3.8–10.5)
WBC # FLD AUTO: 8.01 K/UL — SIGNIFICANT CHANGE UP (ref 3.8–10.5)

## 2024-02-08 PROCEDURE — 99223 1ST HOSP IP/OBS HIGH 75: CPT

## 2024-02-08 RX ADMIN — SACUBITRIL AND VALSARTAN 1 TABLET(S): 24; 26 TABLET, FILM COATED ORAL at 18:03

## 2024-02-08 RX ADMIN — GABAPENTIN 800 MILLIGRAM(S): 400 CAPSULE ORAL at 21:57

## 2024-02-08 RX ADMIN — Medication 975 MILLIGRAM(S): at 19:00

## 2024-02-08 RX ADMIN — OXYCODONE HYDROCHLORIDE 10 MILLIGRAM(S): 5 TABLET ORAL at 15:45

## 2024-02-08 RX ADMIN — Medication 325 MILLIGRAM(S): at 06:00

## 2024-02-08 RX ADMIN — BUDESONIDE AND FORMOTEROL FUMARATE DIHYDRATE 2 PUFF(S): 160; 4.5 AEROSOL RESPIRATORY (INHALATION) at 21:57

## 2024-02-08 RX ADMIN — MONTELUKAST 10 MILLIGRAM(S): 4 TABLET, CHEWABLE ORAL at 12:38

## 2024-02-08 RX ADMIN — PANTOPRAZOLE SODIUM 40 MILLIGRAM(S): 20 TABLET, DELAYED RELEASE ORAL at 06:00

## 2024-02-08 RX ADMIN — Medication 1: at 07:19

## 2024-02-08 RX ADMIN — Medication 20 MILLIGRAM(S): at 06:00

## 2024-02-08 RX ADMIN — Medication 20 MILLIGRAM(S): at 17:08

## 2024-02-08 RX ADMIN — SENNA PLUS 2 TABLET(S): 8.6 TABLET ORAL at 21:56

## 2024-02-08 RX ADMIN — SACUBITRIL AND VALSARTAN 1 TABLET(S): 24; 26 TABLET, FILM COATED ORAL at 06:02

## 2024-02-08 RX ADMIN — OXYCODONE HYDROCHLORIDE 5 MILLIGRAM(S): 5 TABLET ORAL at 10:25

## 2024-02-08 RX ADMIN — OXYCODONE HYDROCHLORIDE 10 MILLIGRAM(S): 5 TABLET ORAL at 00:49

## 2024-02-08 RX ADMIN — Medication 975 MILLIGRAM(S): at 18:04

## 2024-02-08 RX ADMIN — POLYETHYLENE GLYCOL 3350 17 GRAM(S): 17 POWDER, FOR SOLUTION ORAL at 21:56

## 2024-02-08 RX ADMIN — OXYCODONE HYDROCHLORIDE 10 MILLIGRAM(S): 5 TABLET ORAL at 19:13

## 2024-02-08 RX ADMIN — OXYCODONE HYDROCHLORIDE 10 MILLIGRAM(S): 5 TABLET ORAL at 14:49

## 2024-02-08 RX ADMIN — ATORVASTATIN CALCIUM 80 MILLIGRAM(S): 80 TABLET, FILM COATED ORAL at 21:57

## 2024-02-08 RX ADMIN — BUDESONIDE AND FORMOTEROL FUMARATE DIHYDRATE 2 PUFF(S): 160; 4.5 AEROSOL RESPIRATORY (INHALATION) at 09:36

## 2024-02-08 RX ADMIN — Medication 100 MILLIGRAM(S): at 06:01

## 2024-02-08 RX ADMIN — Medication 0.6 MILLIGRAM(S): at 12:38

## 2024-02-08 RX ADMIN — Medication 400 MILLIGRAM(S): at 09:35

## 2024-02-08 RX ADMIN — Medication 1: at 17:07

## 2024-02-08 RX ADMIN — Medication 325 MILLIGRAM(S): at 17:08

## 2024-02-08 RX ADMIN — Medication 400 MILLIGRAM(S): at 02:04

## 2024-02-08 RX ADMIN — OXYCODONE HYDROCHLORIDE 5 MILLIGRAM(S): 5 TABLET ORAL at 09:34

## 2024-02-08 RX ADMIN — Medication 1000 MILLIGRAM(S): at 10:25

## 2024-02-08 RX ADMIN — GABAPENTIN 800 MILLIGRAM(S): 400 CAPSULE ORAL at 14:49

## 2024-02-08 RX ADMIN — ISOSORBIDE MONONITRATE 30 MILLIGRAM(S): 60 TABLET, EXTENDED RELEASE ORAL at 12:38

## 2024-02-08 RX ADMIN — TAMSULOSIN HYDROCHLORIDE 0.4 MILLIGRAM(S): 0.4 CAPSULE ORAL at 21:56

## 2024-02-08 RX ADMIN — Medication 100 MILLIGRAM(S): at 05:59

## 2024-02-08 RX ADMIN — Medication 100 MILLIGRAM(S): at 12:37

## 2024-02-08 RX ADMIN — SODIUM CHLORIDE 500 MILLILITER(S): 9 INJECTION, SOLUTION INTRAVENOUS at 06:02

## 2024-02-08 RX ADMIN — Medication 1000 MILLIGRAM(S): at 03:04

## 2024-02-08 RX ADMIN — Medication 1: at 11:33

## 2024-02-08 RX ADMIN — ISOSORBIDE MONONITRATE 60 MILLIGRAM(S): 60 TABLET, EXTENDED RELEASE ORAL at 12:38

## 2024-02-08 RX ADMIN — AMLODIPINE BESYLATE 10 MILLIGRAM(S): 2.5 TABLET ORAL at 06:00

## 2024-02-08 RX ADMIN — GABAPENTIN 800 MILLIGRAM(S): 400 CAPSULE ORAL at 06:01

## 2024-02-08 RX ADMIN — OXYCODONE HYDROCHLORIDE 10 MILLIGRAM(S): 5 TABLET ORAL at 19:50

## 2024-02-08 RX ADMIN — LORATADINE 10 MILLIGRAM(S): 10 TABLET ORAL at 12:39

## 2024-02-08 NOTE — DISCHARGE NOTE NURSING/CASE MANAGEMENT/SOCIAL WORK - NSSCTYPOFSERV_GEN_ALL_CORE
A home physical therapist is anticipated to visit you the day after hospital discharge; the above home care agency will contact you to arrange the time of initial visit.

## 2024-02-08 NOTE — PHYSICAL THERAPY INITIAL EVALUATION ADULT - GENERAL OBSERVATIONS, REHAB EVAL
Pt encountered in semisupine position, no distress, AxOx4, with +IV, left knee dressing dry/intact, and wife @ bedside. Pt agreeable to participate in PT evauation.

## 2024-02-08 NOTE — CONSULT NOTE ADULT - PROBLEM SELECTOR RECOMMENDATION 9
-Pain well controlled; continue management and pain control per ortho recs with tylenol tid, neurontin tid, dilaudid prn, oxycodone IR prn and tramadol prn  -c/w bowel regimen  -c/w incentive spirometer use  -c/w PT

## 2024-02-08 NOTE — OCCUPATIONAL THERAPY INITIAL EVALUATION ADULT - PERTINENT HX OF CURRENT PROBLEM, REHAB EVAL
78 year old male with hx DM, CHF, HTN, presented s/p left total knee arthroplasty with jennifer on 2/7/24.

## 2024-02-08 NOTE — DISCHARGE NOTE NURSING/CASE MANAGEMENT/SOCIAL WORK - PATIENT PORTAL LINK FT
You can access the FollowMyHealth Patient Portal offered by [facility] by registering at the following website: [email or web address]. By joining [employer]’s FollowMyHealth portal, you will also be able to view your health information using other applications (apps) compatible with our system.

## 2024-02-08 NOTE — PHYSICAL THERAPY INITIAL EVALUATION ADULT - DIAGNOSIS, PT EVAL
Pt s/p Left Total Knee Replacement on 02/07/2024; pt presents with decreased strength, decreased balance, and antalgic gait.

## 2024-02-08 NOTE — OCCUPATIONAL THERAPY INITIAL EVALUATION ADULT - GENERAL OBSERVATIONS, REHAB EVAL
Patient received semisupine in bed in NAD; agreeable to participate in OT evaluation. Spouse at bedside. +IV. +left LE in knee immobilizer when out of bed. BP: 121/58 mmHg.

## 2024-02-08 NOTE — PHYSICAL THERAPY INITIAL EVALUATION ADULT - PERTINENT HX OF CURRENT PROBLEM, REHAB EVAL
Pt is a  78 year old male with hx DM, CHF, HTN, presented to PST with pre op dx of unilateral primary osteoarthritis left knee, patient is scheduled for left total knee arthroscopy with jennifer

## 2024-02-08 NOTE — DISCHARGE NOTE NURSING/CASE MANAGEMENT/SOCIAL WORK - NSDCPNINST_GEN_ALL_CORE
Make your postop appointment with Dr Masters.  Notify Dr Masters if you experience any increase in pain not relieved with medication, any redness, drainage or swelling around incision or any fever >100.5.  Drink plenty of fluids.  No heavy lifting or straining.  Continue to do your exercises, elevate your leg and apply cold therapy.  Continue to follow a consistent carbohydrate diet and follow up with PMD for continued management of your diabetes.  Use over the counter stool softeners to assist with constipation which can be a side effect of narcotic pain medication.

## 2024-02-08 NOTE — CONSULT NOTE ADULT - TIME BILLING

## 2024-02-08 NOTE — PHYSICAL THERAPY INITIAL EVALUATION ADULT - ADDITIONAL COMMENTS
Pt reports that he lives in a private house with his wife with ~ 6 steps to enter; (+)bilateral handrails; bedroom/bathroom is on the first floor. Prior to hospital admission, pt was completely independent and used a single axis cane with ambulation. Pt denies any recent falls.    Pt left comfortable seated in chair, NAD, all lines intact, all precautions maintained, with call bell in reach, wife @ bedside, and RN aware of PT evaluation. Pt reports that he lives in a private house with his wife with ~ 6 steps to enter; (+)bilateral handrails; bedroom/bathroom is on the first floor. Prior to hospital admission, pt was completely independent and used a single axis cane with ambulation. Pt denies any recent falls.    Pt left comfortable in bed, NAD, all lines intact, all precautions maintained, with call bell in reach, wife @ bedside, and RN aware of PT evaluation.

## 2024-02-08 NOTE — CONSULT NOTE ADULT - SUBJECTIVE AND OBJECTIVE BOX
CHIEF COMPLAINT: Patient is a 78y old  Male who presents with a chief complaint of Left Knee arthroscopy (25 Jan 2024 12:09)      HPI: 78M h/o DM, CHF, HTN, presented to PST with pre op dx of unilateral primary osteoarthritis left knee, patient is scheduled for left total knee arthroscopy with jennifer  (25 Jan 2024 12:09) Patient tolerated procedure well. Denies any F/C/N/V, CP or SOB. Patient c/o moderately controlled pain.     Allergies:  sulfa drugs (Hives; Urticaria)    HOME MEDICATIONS: [x] Reviewed    MEDICATIONS  (STANDING):  acetaminophen     Tablet .. 975 milliGRAM(s) Oral every 8 hours  acetaminophen   IVPB .. 1000 milliGRAM(s) IV Intermittent once  allopurinol 100 milliGRAM(s) Oral daily  amLODIPine   Tablet 10 milliGRAM(s) Oral daily  aspirin 325 milliGRAM(s) Oral two times a day  atorvastatin 80 milliGRAM(s) Oral at bedtime  budesonide 160 MICROgram(s)/formoterol 4.5 MICROgram(s) Inhaler 2 Puff(s) Inhalation two times a day  chlorhexidine 2% Cloths 1 Application(s) Topical daily  colchicine 0.6 milliGRAM(s) Oral daily  dextrose 5%. 1000 milliLiter(s) (100 mL/Hr) IV Continuous <Continuous>  dextrose 5%. 1000 milliLiter(s) (50 mL/Hr) IV Continuous <Continuous>  dextrose 50% Injectable 12.5 Gram(s) IV Push once  dextrose 50% Injectable 25 Gram(s) IV Push once  dextrose 50% Injectable 25 Gram(s) IV Push once  furosemide    Tablet 20 milliGRAM(s) Oral two times a day  gabapentin 800 milliGRAM(s) Oral three times a day  glucagon  Injectable 1 milliGRAM(s) IntraMuscular once  insulin lispro (ADMELOG) corrective regimen sliding scale   SubCutaneous three times a day before meals  insulin lispro (ADMELOG) corrective regimen sliding scale   SubCutaneous at bedtime  isosorbide   mononitrate ER Tablet (IMDUR) 30 milliGRAM(s) Oral daily  isosorbide   mononitrate ER Tablet (IMDUR) 60 milliGRAM(s) Oral daily  lactated ringers. 1000 milliLiter(s) (30 mL/Hr) IV Continuous <Continuous>  loratadine 10 milliGRAM(s) Oral daily  metoprolol succinate  milliGRAM(s) Oral daily  montelukast 10 milliGRAM(s) Oral daily  pantoprazole    Tablet 40 milliGRAM(s) Oral before breakfast  polyethylene glycol 3350 17 Gram(s) Oral at bedtime  sacubitril 24 mG/valsartan 26 mG 1 Tablet(s) Oral two times a day  senna 2 Tablet(s) Oral at bedtime  tamsulosin 0.4 milliGRAM(s) Oral at bedtime    MEDICATIONS  (PRN):  albuterol    90 MICROgram(s) HFA Inhaler 2 Puff(s) Inhalation every 6 hours PRN for bronchospasm  dextrose Oral Gel 15 Gram(s) Oral once PRN Blood Glucose LESS THAN 70 milliGRAM(s)/deciliter  HYDROmorphone  Injectable 0.5 milliGRAM(s) IV Push once PRN Severe Pain (7 - 10)  ondansetron Injectable 4 milliGRAM(s) IV Push every 6 hours PRN Nausea and/or Vomiting  oxyCODONE    IR 10 milliGRAM(s) Oral every 4 hours PRN Severe Pain (7 - 10)  oxyCODONE    IR 5 milliGRAM(s) Oral every 4 hours PRN Moderate Pain (4 - 6)  traMADol 50 milliGRAM(s) Oral every 6 hours PRN Mild Pain (1 - 3)    PAST MEDICAL & SURGICAL HISTORY:  Asthma  DM (diabetes mellitus)  Hypertension  CAD (coronary artery disease)  Hyperlipidemia  Seasonal allergies  Gout  GERD (gastroesophageal reflux disease)  Chronic systolic congestive heart failure  BPH (benign prostatic hyperplasia)  Osteoarthritis  Bilateral hearing loss  Dry eyes  Chronic lumbar radiculopathy  ASHOK (obstructive sleep apnea)  H/O sinus surgery  S/P cataract extraction  [x ] Reviewed     SOCIAL HISTORY:  Substance Use History:  · Substance Use	caffeine  · Caffeine Type	tea  · Caffeine Amount/Frequency	1-2 cups/cans per day    Alcohol Use History:  · Have you ever consumed alcohol	yes...  · Alcohol Type	liquor  · Alcohol Frequency	monthly or less  · Alcohol Amount	1-2 drinks  · 1. Have you felt you ought to CUT down on your drinking?	no  · 2. Have people ANNOYED you by criticizing your drinking?	no  · 3. Have you ever felt bad or GUILTY about your drinking?	no  · 4. Have you ever needed an "EYE OPENER", a drink first thing in the morning to steady your nerves or get rid of a hangover?	no    Tobacco Usage:  · Tobacco Usage: Never smoker    FAMILY HISTORY:  [x] No pertinent family history in first degree relatives     REVIEW OF SYSTEMS:  [x] All other ROS negative  [  ] Unable to obtain due to poor mental status    Vital Signs Last 24 Hrs  T(C): 36.7 (08 Feb 2024 09:57), Max: 37 (07 Feb 2024 16:00)  T(F): 98.1 (08 Feb 2024 09:57), Max: 98.6 (07 Feb 2024 16:00)  HR: 68 (08 Feb 2024 12:30) (56 - 77)  BP: 127/57 (08 Feb 2024 12:30) (95/46 - 127/57)  BP(mean): 73 (07 Feb 2024 18:00) (57 - 86)  RR: 18 (08 Feb 2024 09:57) (15 - 20)  SpO2: 96% (08 Feb 2024 09:57) (94% - 99%)    Parameters below as of 08 Feb 2024 09:57  Patient On (Oxygen Delivery Method): room air    PHYSICAL EXAM:  GENERAL: NAD  HEAD:  Atraumatic, Normocephalic  EYES: EOMI, PERRLA, conjunctiva and sclera clear  ENMT: Moist mucous membranes  NECK: Supple, No JVD  RESPIRATORY: Clear to auscultation bilaterally; No rales, rhonchi, wheezing, or rubs  CARDIOVASCULAR: Regular rate and rhythm; No murmurs, rubs, or gallops  GASTROINTESTINAL: Soft, Nontender, Nondistended; Bowel sounds present  EXTREMITIES:  2+ Peripheral Pulses, No clubbing, cyanosis, or edema  SKIN: Dressing C/D/I    LABS:                        11.6   8.01  )-----------( 116      ( 08 Feb 2024 05:25 )             36.0     Hemoglobin: 11.6 g/dL (02-08 @ 05:25)    02-08    135  |  100  |  14  ----------------------------<  173<H>  4.3   |  24  |  1.09    Ca    8.5      08 Feb 2024 05:25        Urinalysis Basic - ( 08 Feb 2024 05:25 )    Color: x / Appearance: x / SG: x / pH: x  Gluc: 173 mg/dL / Ketone: x  / Bili: x / Urobili: x   Blood: x / Protein: x / Nitrite: x   Leuk Esterase: x / RBC: x / WBC x   Sq Epi: x / Non Sq Epi: x / Bacteria: x    RADIOLOGY & ADDITIONAL STUDIES:  Imaging:   Personally Reviewed:  [x] YES   < from: Xray Knee 1 or 2 Views, Left (02.07.24 @ 15:43) >  Impression:  Expected postsurgical appearance status post left total knee   arthroplasty.  < end of copied text >              [ ] Consultant(s) Notes Reviewed  [x] Care Discussed with Consultants/Other Providers: Ortho PA - discussed post-op care

## 2024-02-08 NOTE — PHYSICAL THERAPY INITIAL EVALUATION ADULT - PASSIVE RANGE OF MOTION EXAMINATION, REHAB EVAL
Left knee 5-90 degrees/bilateral upper extremity Passive ROM was WFL (within functional limits)/Right LE Passive ROM was WFL (within functional limits)

## 2024-02-08 NOTE — PHYSICAL THERAPY INITIAL EVALUATION ADULT - PATIENT PROFILE REVIEW, REHAB EVAL
ACTIVITY: OOB to Chair (start on postoperative day #0); Spoke with SINGH Zuniga prior to PT evaluation--> Pt OK for PT consult/OOB activity; vitals taken;/yes ACTIVITY: OOB to Chair (start on postoperative day #0); Spoke with SINGH Zuniga prior to PT evaluation--> Pt OK for PT consult/OOB activity; vitals taken; /71mmHg, heart rate 71bpm, SpO2 96% on room air/yes

## 2024-02-08 NOTE — OCCUPATIONAL THERAPY INITIAL EVALUATION ADULT - LEVEL OF CONSCIOUSNESS, OT EVAL
Tranexamic Acid Counseling:  Patient advised of the small risk of bleeding problems with tranexamic acid. They were also instructed to call if they developed any nausea, vomiting or diarrhea. All of the patient's questions and concerns were addressed. alert

## 2024-02-08 NOTE — PHYSICAL THERAPY INITIAL EVALUATION ADULT - MANUAL MUSCLE TESTING RESULTS, REHAB EVAL
Bilateral upper extremities 5/5, right lower extremity 5/5, left hamstring 3-/5, left quadriceps 2-/5, left ankle 3/5

## 2024-02-08 NOTE — CONSULT NOTE ADULT - ASSESSMENT
78M h/o DM, CHF, HTN, GERD and asthma p/w unilateral primary osteoarthritis of left knee now s/p left total knee arthroscopy with Peña (2/7/24).

## 2024-02-09 ENCOUNTER — TRANSCRIPTION ENCOUNTER (OUTPATIENT)
Age: 79
End: 2024-02-09

## 2024-02-09 LAB
GLUCOSE BLDC GLUCOMTR-MCNC: 168 MG/DL — HIGH (ref 70–99)
GLUCOSE BLDC GLUCOMTR-MCNC: 191 MG/DL — HIGH (ref 70–99)
GLUCOSE BLDC GLUCOMTR-MCNC: 193 MG/DL — HIGH (ref 70–99)
GLUCOSE BLDC GLUCOMTR-MCNC: 195 MG/DL — HIGH (ref 70–99)
GLUCOSE BLDC GLUCOMTR-MCNC: 200 MG/DL — HIGH (ref 70–99)

## 2024-02-09 PROCEDURE — 99232 SBSQ HOSP IP/OBS MODERATE 35: CPT

## 2024-02-09 RX ORDER — ASPIRIN/CALCIUM CARB/MAGNESIUM 324 MG
325 TABLET ORAL
Refills: 0 | Status: DISCONTINUED | OUTPATIENT
Start: 2024-02-09 | End: 2024-02-12

## 2024-02-09 RX ADMIN — MONTELUKAST 10 MILLIGRAM(S): 4 TABLET, CHEWABLE ORAL at 12:24

## 2024-02-09 RX ADMIN — Medication 100 MILLIGRAM(S): at 12:25

## 2024-02-09 RX ADMIN — ISOSORBIDE MONONITRATE 30 MILLIGRAM(S): 60 TABLET, EXTENDED RELEASE ORAL at 12:25

## 2024-02-09 RX ADMIN — PANTOPRAZOLE SODIUM 40 MILLIGRAM(S): 20 TABLET, DELAYED RELEASE ORAL at 05:22

## 2024-02-09 RX ADMIN — Medication 975 MILLIGRAM(S): at 09:23

## 2024-02-09 RX ADMIN — LORATADINE 10 MILLIGRAM(S): 10 TABLET ORAL at 12:25

## 2024-02-09 RX ADMIN — OXYCODONE HYDROCHLORIDE 10 MILLIGRAM(S): 5 TABLET ORAL at 06:58

## 2024-02-09 RX ADMIN — TAMSULOSIN HYDROCHLORIDE 0.4 MILLIGRAM(S): 0.4 CAPSULE ORAL at 22:02

## 2024-02-09 RX ADMIN — OXYCODONE HYDROCHLORIDE 10 MILLIGRAM(S): 5 TABLET ORAL at 05:57

## 2024-02-09 RX ADMIN — Medication 975 MILLIGRAM(S): at 18:52

## 2024-02-09 RX ADMIN — ATORVASTATIN CALCIUM 80 MILLIGRAM(S): 80 TABLET, FILM COATED ORAL at 22:02

## 2024-02-09 RX ADMIN — SACUBITRIL AND VALSARTAN 1 TABLET(S): 24; 26 TABLET, FILM COATED ORAL at 05:22

## 2024-02-09 RX ADMIN — OXYCODONE HYDROCHLORIDE 10 MILLIGRAM(S): 5 TABLET ORAL at 11:10

## 2024-02-09 RX ADMIN — GABAPENTIN 800 MILLIGRAM(S): 400 CAPSULE ORAL at 14:34

## 2024-02-09 RX ADMIN — Medication 975 MILLIGRAM(S): at 02:45

## 2024-02-09 RX ADMIN — GABAPENTIN 800 MILLIGRAM(S): 400 CAPSULE ORAL at 22:02

## 2024-02-09 RX ADMIN — OXYCODONE HYDROCHLORIDE 10 MILLIGRAM(S): 5 TABLET ORAL at 10:12

## 2024-02-09 RX ADMIN — Medication 325 MILLIGRAM(S): at 05:22

## 2024-02-09 RX ADMIN — GABAPENTIN 800 MILLIGRAM(S): 400 CAPSULE ORAL at 05:22

## 2024-02-09 RX ADMIN — OXYCODONE HYDROCHLORIDE 10 MILLIGRAM(S): 5 TABLET ORAL at 16:06

## 2024-02-09 RX ADMIN — Medication 1: at 11:33

## 2024-02-09 RX ADMIN — Medication 975 MILLIGRAM(S): at 10:12

## 2024-02-09 RX ADMIN — Medication 20 MILLIGRAM(S): at 14:34

## 2024-02-09 RX ADMIN — Medication 1: at 07:36

## 2024-02-09 RX ADMIN — Medication 975 MILLIGRAM(S): at 01:39

## 2024-02-09 RX ADMIN — SACUBITRIL AND VALSARTAN 1 TABLET(S): 24; 26 TABLET, FILM COATED ORAL at 17:58

## 2024-02-09 RX ADMIN — Medication 0.6 MILLIGRAM(S): at 12:24

## 2024-02-09 RX ADMIN — OXYCODONE HYDROCHLORIDE 10 MILLIGRAM(S): 5 TABLET ORAL at 17:05

## 2024-02-09 RX ADMIN — ISOSORBIDE MONONITRATE 60 MILLIGRAM(S): 60 TABLET, EXTENDED RELEASE ORAL at 12:25

## 2024-02-09 RX ADMIN — Medication 20 MILLIGRAM(S): at 05:22

## 2024-02-09 RX ADMIN — OXYCODONE HYDROCHLORIDE 10 MILLIGRAM(S): 5 TABLET ORAL at 02:45

## 2024-02-09 RX ADMIN — SENNA PLUS 2 TABLET(S): 8.6 TABLET ORAL at 22:02

## 2024-02-09 RX ADMIN — AMLODIPINE BESYLATE 10 MILLIGRAM(S): 2.5 TABLET ORAL at 05:22

## 2024-02-09 RX ADMIN — Medication 100 MILLIGRAM(S): at 05:22

## 2024-02-09 RX ADMIN — Medication 1: at 17:57

## 2024-02-09 RX ADMIN — BUDESONIDE AND FORMOTEROL FUMARATE DIHYDRATE 2 PUFF(S): 160; 4.5 AEROSOL RESPIRATORY (INHALATION) at 09:22

## 2024-02-09 RX ADMIN — Medication 975 MILLIGRAM(S): at 17:53

## 2024-02-09 RX ADMIN — OXYCODONE HYDROCHLORIDE 10 MILLIGRAM(S): 5 TABLET ORAL at 01:39

## 2024-02-09 RX ADMIN — POLYETHYLENE GLYCOL 3350 17 GRAM(S): 17 POWDER, FOR SOLUTION ORAL at 22:02

## 2024-02-09 RX ADMIN — Medication 325 MILLIGRAM(S): at 17:57

## 2024-02-09 NOTE — DISCHARGE NOTE PROVIDER - NSDCCPTREATMENT_GEN_ALL_CORE_FT
PRINCIPAL PROCEDURE  Procedure: Total knee replacement  Findings and Treatment: Pain control:    Standing:         -Acetaminophen 500mg - 2 tabs every 8 hours  As needed:        -Tramadol 50mg - 1 tab every 6 hours - Take only if needed for MODERATE pain       -oxycodone 5mg - 1 tab every 4-6 hours - Take only if needed for SEVERE or BREAKTHROUGH pain  Oxycodone and Tramadol have been sent to your pharmacy. Please do not drive, operate machinery, or make important decisions while taking these medications.   Other Medications: (Standing)  -Aspirin (Enteric Coated) 325mg every 12 hours - to prevent blood clots (for 6 weeks post operatively.)  -Protonix 40mg - 1 tab every 24 hours - to prevent stomach irritation/ulcers  -Senna 8.6mg - 2 pills every 24 hours - stool softener  -Miralax 17g - daily - constipation   Follow up: Please follow up at your prescheduled post-operative follow up appointment with Dr. Masters for 2 weeks after hospital discharge. Please call with any questions or concerns including fevers, worsening pain, pus from the wounds, redness of the skin and difficulty breathing or heaviness in the chest at [phone].

## 2024-02-09 NOTE — DISCHARGE NOTE PROVIDER - NSDCFUSCHEDAPPT_GEN_ALL_CORE_FT
Dar Masters  [facility] Physician [facility]  ORTHOSUR [address] [facility]  Scheduled Appointment: 02/22/2024

## 2024-02-09 NOTE — PROGRESS NOTE ADULT - TIME BILLING

## 2024-02-09 NOTE — DISCHARGE NOTE PROVIDER - NSDCMRMEDTOKEN_GEN_ALL_CORE_FT
acetaminophen 500 mg oral tablet: 1 tab(s) orally prn  Albuterol (Eqv-ProAir HFA) 90 mcg/inh inhalation aerosol: 2 puff(s) inhaled prn  Albuterol (Eqv-ProAir HFA) 90 mcg/inh inhalation aerosol: 2 puff(s) inhaled every 4 hours as needed for  bronchospasm  allopurinol 100 mg oral tablet: 1 tab(s) orally once a day  amLODIPine 10 mg oral tablet: 1 tab(s) orally once a day  Aspirin EC 81 mg oral delayed release tablet: 1 tab(s) orally once a day  atorvastatin 80 mg oral tablet: 1 tab(s) orally once a day  azelastine 137 mcg/inh (0.1%) nasal spray: 2 spray(s) intranasally 2 times a day  cetirizine 10 mg oral tablet: 1 tab(s) orally once a day  colchicine 0.6 mg oral capsule: 1 cap(s) orally once a day  Daily-Jerry 1 tab once daily:   Entresto 24 mg-26 mg oral tablet: 1 tab(s) orally 2 times a day  fluticasone 50 mcg/inh nasal spray: 1 spray(s) in each nostril once a day (at bedtime)  furosemide 20 mg oral tablet: 1 tab(s) orally 2 times a day  gabapentin 800 mg oral tablet: 1 tab(s) orally 3 times a day  glimepiride 1 mg oral tablet: 1 tab(s) orally once a day before breakfast  isosorbide mononitrate 30 mg oral tablet, extended release: 1 tab(s) orally once a day  isosorbide mononitrate 60 mg oral tablet, extended release: 1 tab(s) orally once a day  Janumet 50 mg-1000 mg oral tablet: 1 tab(s) orally 2 times a day  Jardiance 10 mg oral tablet: 1 tab(s) orally once a day  meloxicam 15 mg oral tablet: 1 tab(s) orally once a day as needed for  moderate pain  metoprolol succinate 100 mg oral tablet, extended release: 1 tab(s) orally once a day  montelukast 10 mg oral tablet: 1 tab(s) orally once a day  pantoprazole 20 mg oral delayed release tablet: 1 tab(s) orally once a day  Symbicort 160 mcg-4.5 mcg/inh inhalation aerosol: 2 puff(s) inhaled once a day  tamsulosin 0.4 mg oral capsule: 2 cap(s) orally once a day   acetaminophen 325 mg oral tablet: 3 tab(s) orally every 8 hours  Albuterol (Eqv-ProAir HFA) 90 mcg/inh inhalation aerosol: 2 puff(s) inhaled prn  Albuterol (Eqv-ProAir HFA) 90 mcg/inh inhalation aerosol: 2 puff(s) inhaled every 4 hours as needed for  bronchospasm  allopurinol 100 mg oral tablet: 1 tab(s) orally once a day  amLODIPine 10 mg oral tablet: 1 tab(s) orally once a day  aspirin 325 mg oral delayed release tablet: 1 tab(s) orally 2 times a day  atorvastatin 80 mg oral tablet: 1 tab(s) orally once a day  azelastine 137 mcg/inh (0.1%) nasal spray: 2 spray(s) intranasally 2 times a day  cetirizine 10 mg oral tablet: 1 tab(s) orally once a day  colchicine 0.6 mg oral capsule: 1 cap(s) orally once a day  Daily-Jerry 1 tab once daily:   Entresto 24 mg-26 mg oral tablet: 1 tab(s) orally 2 times a day  fluticasone 50 mcg/inh nasal spray: 1 spray(s) in each nostril once a day (at bedtime)  furosemide 20 mg oral tablet: 1 tab(s) orally 2 times a day  gabapentin 800 mg oral tablet: 1 tab(s) orally 3 times a day  glimepiride 1 mg oral tablet: 1 tab(s) orally once a day before breakfast  isosorbide mononitrate 30 mg oral tablet, extended release: 1 tab(s) orally once a day  isosorbide mononitrate 60 mg oral tablet, extended release: 1 tab(s) orally once a day  Janumet 50 mg-1000 mg oral tablet: 1 tab(s) orally 2 times a day  Jardiance 10 mg oral tablet: 1 tab(s) orally once a day  metoprolol succinate 100 mg oral tablet, extended release: 1 tab(s) orally once a day  montelukast 10 mg oral tablet: 1 tab(s) orally once a day  oxyCODONE 5 mg oral tablet: 1 tab(s) orally every 4 hours as needed for  severe pain MDD: 6  pantoprazole 40 mg oral delayed release tablet: 1 tab(s) orally once a day (before a meal)  polyethylene glycol 3350 oral powder for reconstitution: 17 gram(s) orally once a day (at bedtime)  senna leaf extract oral tablet: 2 tab(s) orally once a day (at bedtime)  Symbicort 160 mcg-4.5 mcg/inh inhalation aerosol: 2 puff(s) inhaled once a day  tamsulosin 0.4 mg oral capsule: 2 cap(s) orally once a day

## 2024-02-09 NOTE — DISCHARGE NOTE PROVIDER - CARE PROVIDER_API CALL
Dar Masters  Orthopaedic Surgery  [address] [place] [address], [facility]  [place] [address]  Phone: [phone]  Fax: [phone]  Established Patient  Follow Up Time:

## 2024-02-09 NOTE — DISCHARGE NOTE PROVIDER - HOSPITAL COURSE
This is a 77yo Male with PMH of [place], DM, CAD, asthma, CHF, GERD, HTN, HLD, and ASHOK who presents to [facility] for orthopedic surgery. Patient s/p left TKA with Dr. Masters on 2/7/2024. Patient tolerated the procedure well without any intraoperative complications. Patient tolerated physical therapy well, and the pain was controlled. Patient is weight bearing as tolerated with cane/walker as needed. Seen by medical attending for continuity of care and management and cleared for safe discharge. Keep dressing/incision clean, dry and intact. Any suture/staples to be removed on post-op day #14 at your office visit. Patient is on 325mg of ASA for DVT prophylaxis, please take for 6 weeks unless otherwise instructed by your surgeon. Please follow up with Dr. Masters in 2 weeks. Please follow up with your PMD for continuity of care and management as medications may have changed.

## 2024-02-10 LAB
GLUCOSE BLDC GLUCOMTR-MCNC: 174 MG/DL — HIGH (ref 70–99)
GLUCOSE BLDC GLUCOMTR-MCNC: 195 MG/DL — HIGH (ref 70–99)
GLUCOSE BLDC GLUCOMTR-MCNC: 222 MG/DL — HIGH (ref 70–99)
GLUCOSE BLDC GLUCOMTR-MCNC: 245 MG/DL — HIGH (ref 70–99)

## 2024-02-10 RX ADMIN — Medication 20 MILLIGRAM(S): at 06:57

## 2024-02-10 RX ADMIN — OXYCODONE HYDROCHLORIDE 10 MILLIGRAM(S): 5 TABLET ORAL at 14:30

## 2024-02-10 RX ADMIN — SACUBITRIL AND VALSARTAN 1 TABLET(S): 24; 26 TABLET, FILM COATED ORAL at 06:56

## 2024-02-10 RX ADMIN — Medication 1: at 17:33

## 2024-02-10 RX ADMIN — Medication 325 MILLIGRAM(S): at 17:34

## 2024-02-10 RX ADMIN — Medication 975 MILLIGRAM(S): at 09:07

## 2024-02-10 RX ADMIN — Medication 0.6 MILLIGRAM(S): at 12:50

## 2024-02-10 RX ADMIN — BUDESONIDE AND FORMOTEROL FUMARATE DIHYDRATE 2 PUFF(S): 160; 4.5 AEROSOL RESPIRATORY (INHALATION) at 21:58

## 2024-02-10 RX ADMIN — Medication 1: at 07:02

## 2024-02-10 RX ADMIN — OXYCODONE HYDROCHLORIDE 10 MILLIGRAM(S): 5 TABLET ORAL at 15:30

## 2024-02-10 RX ADMIN — AMLODIPINE BESYLATE 10 MILLIGRAM(S): 2.5 TABLET ORAL at 06:57

## 2024-02-10 RX ADMIN — Medication 100 MILLIGRAM(S): at 12:51

## 2024-02-10 RX ADMIN — TAMSULOSIN HYDROCHLORIDE 0.4 MILLIGRAM(S): 0.4 CAPSULE ORAL at 22:59

## 2024-02-10 RX ADMIN — OXYCODONE HYDROCHLORIDE 10 MILLIGRAM(S): 5 TABLET ORAL at 03:59

## 2024-02-10 RX ADMIN — BUDESONIDE AND FORMOTEROL FUMARATE DIHYDRATE 2 PUFF(S): 160; 4.5 AEROSOL RESPIRATORY (INHALATION) at 09:07

## 2024-02-10 RX ADMIN — GABAPENTIN 800 MILLIGRAM(S): 400 CAPSULE ORAL at 14:30

## 2024-02-10 RX ADMIN — POLYETHYLENE GLYCOL 3350 17 GRAM(S): 17 POWDER, FOR SOLUTION ORAL at 22:57

## 2024-02-10 RX ADMIN — Medication 975 MILLIGRAM(S): at 17:34

## 2024-02-10 RX ADMIN — Medication 975 MILLIGRAM(S): at 02:55

## 2024-02-10 RX ADMIN — GABAPENTIN 800 MILLIGRAM(S): 400 CAPSULE ORAL at 22:57

## 2024-02-10 RX ADMIN — SENNA PLUS 2 TABLET(S): 8.6 TABLET ORAL at 22:51

## 2024-02-10 RX ADMIN — MONTELUKAST 10 MILLIGRAM(S): 4 TABLET, CHEWABLE ORAL at 12:51

## 2024-02-10 RX ADMIN — OXYCODONE HYDROCHLORIDE 10 MILLIGRAM(S): 5 TABLET ORAL at 09:07

## 2024-02-10 RX ADMIN — LORATADINE 10 MILLIGRAM(S): 10 TABLET ORAL at 12:50

## 2024-02-10 RX ADMIN — SACUBITRIL AND VALSARTAN 1 TABLET(S): 24; 26 TABLET, FILM COATED ORAL at 17:34

## 2024-02-10 RX ADMIN — Medication 100 MILLIGRAM(S): at 06:56

## 2024-02-10 RX ADMIN — Medication 975 MILLIGRAM(S): at 18:33

## 2024-02-10 RX ADMIN — Medication 975 MILLIGRAM(S): at 10:07

## 2024-02-10 RX ADMIN — ISOSORBIDE MONONITRATE 60 MILLIGRAM(S): 60 TABLET, EXTENDED RELEASE ORAL at 12:51

## 2024-02-10 RX ADMIN — OXYCODONE HYDROCHLORIDE 10 MILLIGRAM(S): 5 TABLET ORAL at 10:07

## 2024-02-10 RX ADMIN — ISOSORBIDE MONONITRATE 30 MILLIGRAM(S): 60 TABLET, EXTENDED RELEASE ORAL at 12:51

## 2024-02-10 RX ADMIN — GABAPENTIN 800 MILLIGRAM(S): 400 CAPSULE ORAL at 06:56

## 2024-02-10 RX ADMIN — Medication 325 MILLIGRAM(S): at 06:57

## 2024-02-10 RX ADMIN — Medication 20 MILLIGRAM(S): at 14:31

## 2024-02-10 RX ADMIN — CHLORHEXIDINE GLUCONATE 1 APPLICATION(S): 213 SOLUTION TOPICAL at 12:49

## 2024-02-10 RX ADMIN — Medication 2: at 11:26

## 2024-02-10 RX ADMIN — ATORVASTATIN CALCIUM 80 MILLIGRAM(S): 80 TABLET, FILM COATED ORAL at 22:57

## 2024-02-11 LAB
GLUCOSE BLDC GLUCOMTR-MCNC: 169 MG/DL — HIGH (ref 70–99)
GLUCOSE BLDC GLUCOMTR-MCNC: 183 MG/DL — HIGH (ref 70–99)
GLUCOSE BLDC GLUCOMTR-MCNC: 210 MG/DL — HIGH (ref 70–99)
GLUCOSE BLDC GLUCOMTR-MCNC: 259 MG/DL — HIGH (ref 70–99)

## 2024-02-11 RX ADMIN — GABAPENTIN 800 MILLIGRAM(S): 400 CAPSULE ORAL at 21:06

## 2024-02-11 RX ADMIN — Medication 975 MILLIGRAM(S): at 03:22

## 2024-02-11 RX ADMIN — Medication 100 MILLIGRAM(S): at 06:59

## 2024-02-11 RX ADMIN — OXYCODONE HYDROCHLORIDE 10 MILLIGRAM(S): 5 TABLET ORAL at 16:17

## 2024-02-11 RX ADMIN — OXYCODONE HYDROCHLORIDE 10 MILLIGRAM(S): 5 TABLET ORAL at 10:44

## 2024-02-11 RX ADMIN — Medication 1: at 22:20

## 2024-02-11 RX ADMIN — ISOSORBIDE MONONITRATE 30 MILLIGRAM(S): 60 TABLET, EXTENDED RELEASE ORAL at 13:09

## 2024-02-11 RX ADMIN — GABAPENTIN 800 MILLIGRAM(S): 400 CAPSULE ORAL at 06:57

## 2024-02-11 RX ADMIN — AMLODIPINE BESYLATE 10 MILLIGRAM(S): 2.5 TABLET ORAL at 07:20

## 2024-02-11 RX ADMIN — Medication 100 MILLIGRAM(S): at 13:09

## 2024-02-11 RX ADMIN — POLYETHYLENE GLYCOL 3350 17 GRAM(S): 17 POWDER, FOR SOLUTION ORAL at 21:07

## 2024-02-11 RX ADMIN — Medication 975 MILLIGRAM(S): at 11:44

## 2024-02-11 RX ADMIN — OXYCODONE HYDROCHLORIDE 10 MILLIGRAM(S): 5 TABLET ORAL at 15:17

## 2024-02-11 RX ADMIN — Medication 2: at 11:36

## 2024-02-11 RX ADMIN — PANTOPRAZOLE SODIUM 40 MILLIGRAM(S): 20 TABLET, DELAYED RELEASE ORAL at 07:21

## 2024-02-11 RX ADMIN — Medication 0.6 MILLIGRAM(S): at 13:09

## 2024-02-11 RX ADMIN — SACUBITRIL AND VALSARTAN 1 TABLET(S): 24; 26 TABLET, FILM COATED ORAL at 06:45

## 2024-02-11 RX ADMIN — OXYCODONE HYDROCHLORIDE 10 MILLIGRAM(S): 5 TABLET ORAL at 11:44

## 2024-02-11 RX ADMIN — OXYCODONE HYDROCHLORIDE 10 MILLIGRAM(S): 5 TABLET ORAL at 01:40

## 2024-02-11 RX ADMIN — MONTELUKAST 10 MILLIGRAM(S): 4 TABLET, CHEWABLE ORAL at 13:10

## 2024-02-11 RX ADMIN — OXYCODONE HYDROCHLORIDE 10 MILLIGRAM(S): 5 TABLET ORAL at 00:40

## 2024-02-11 RX ADMIN — Medication 325 MILLIGRAM(S): at 18:06

## 2024-02-11 RX ADMIN — Medication 20 MILLIGRAM(S): at 06:40

## 2024-02-11 RX ADMIN — ISOSORBIDE MONONITRATE 60 MILLIGRAM(S): 60 TABLET, EXTENDED RELEASE ORAL at 13:10

## 2024-02-11 RX ADMIN — LORATADINE 10 MILLIGRAM(S): 10 TABLET ORAL at 13:10

## 2024-02-11 RX ADMIN — Medication 975 MILLIGRAM(S): at 18:06

## 2024-02-11 RX ADMIN — BUDESONIDE AND FORMOTEROL FUMARATE DIHYDRATE 2 PUFF(S): 160; 4.5 AEROSOL RESPIRATORY (INHALATION) at 21:07

## 2024-02-11 RX ADMIN — SENNA PLUS 2 TABLET(S): 8.6 TABLET ORAL at 21:07

## 2024-02-11 RX ADMIN — Medication 975 MILLIGRAM(S): at 10:44

## 2024-02-11 RX ADMIN — OXYCODONE HYDROCHLORIDE 10 MILLIGRAM(S): 5 TABLET ORAL at 21:06

## 2024-02-11 RX ADMIN — ATORVASTATIN CALCIUM 80 MILLIGRAM(S): 80 TABLET, FILM COATED ORAL at 21:06

## 2024-02-11 RX ADMIN — Medication 325 MILLIGRAM(S): at 06:55

## 2024-02-11 RX ADMIN — Medication 20 MILLIGRAM(S): at 13:09

## 2024-02-11 RX ADMIN — Medication 1: at 07:44

## 2024-02-11 RX ADMIN — OXYCODONE HYDROCHLORIDE 10 MILLIGRAM(S): 5 TABLET ORAL at 22:06

## 2024-02-11 RX ADMIN — TAMSULOSIN HYDROCHLORIDE 0.4 MILLIGRAM(S): 0.4 CAPSULE ORAL at 21:06

## 2024-02-11 RX ADMIN — Medication 975 MILLIGRAM(S): at 02:22

## 2024-02-11 RX ADMIN — SACUBITRIL AND VALSARTAN 1 TABLET(S): 24; 26 TABLET, FILM COATED ORAL at 18:06

## 2024-02-11 RX ADMIN — Medication 1: at 16:50

## 2024-02-12 VITALS
DIASTOLIC BLOOD PRESSURE: 77 MMHG | RESPIRATION RATE: 18 BRPM | HEART RATE: 78 BPM | OXYGEN SATURATION: 100 % | SYSTOLIC BLOOD PRESSURE: 137 MMHG | TEMPERATURE: 98 F

## 2024-02-12 LAB
GLUCOSE BLDC GLUCOMTR-MCNC: 186 MG/DL — HIGH (ref 70–99)
GLUCOSE BLDC GLUCOMTR-MCNC: 241 MG/DL — HIGH (ref 70–99)

## 2024-02-12 PROCEDURE — 99232 SBSQ HOSP IP/OBS MODERATE 35: CPT

## 2024-02-12 RX ORDER — POLYETHYLENE GLYCOL 3350 17 G/17G
17 POWDER, FOR SOLUTION ORAL
Qty: 0 | Refills: 0 | DISCHARGE
Start: 2024-02-12

## 2024-02-12 RX ORDER — ASPIRIN/CALCIUM CARB/MAGNESIUM 324 MG
1 TABLET ORAL
Refills: 0 | DISCHARGE

## 2024-02-12 RX ORDER — PANTOPRAZOLE SODIUM 20 MG/1
1 TABLET, DELAYED RELEASE ORAL
Refills: 0 | DISCHARGE

## 2024-02-12 RX ORDER — ASPIRIN/CALCIUM CARB/MAGNESIUM 324 MG
1 TABLET ORAL
Qty: 84 | Refills: 0
Start: 2024-02-12 | End: 2024-03-24

## 2024-02-12 RX ORDER — OXYCODONE HYDROCHLORIDE 5 MG/1
1 TABLET ORAL
Qty: 42 | Refills: 0
Start: 2024-02-12 | End: 2024-02-18

## 2024-02-12 RX ORDER — ACETAMINOPHEN 500 MG
3 TABLET ORAL
Qty: 0 | Refills: 0 | DISCHARGE
Start: 2024-02-12

## 2024-02-12 RX ORDER — MELOXICAM 15 MG/1
1 TABLET ORAL
Refills: 0 | DISCHARGE

## 2024-02-12 RX ORDER — PANTOPRAZOLE SODIUM 20 MG/1
1 TABLET, DELAYED RELEASE ORAL
Qty: 30 | Refills: 0
Start: 2024-02-12 | End: 2024-03-12

## 2024-02-12 RX ORDER — ACETAMINOPHEN 500 MG
1 TABLET ORAL
Refills: 0 | DISCHARGE

## 2024-02-12 RX ORDER — SENNA PLUS 8.6 MG/1
2 TABLET ORAL
Qty: 0 | Refills: 0 | DISCHARGE
Start: 2024-02-12

## 2024-02-12 RX ADMIN — ISOSORBIDE MONONITRATE 30 MILLIGRAM(S): 60 TABLET, EXTENDED RELEASE ORAL at 11:45

## 2024-02-12 RX ADMIN — OXYCODONE HYDROCHLORIDE 10 MILLIGRAM(S): 5 TABLET ORAL at 02:09

## 2024-02-12 RX ADMIN — Medication 975 MILLIGRAM(S): at 02:04

## 2024-02-12 RX ADMIN — Medication 975 MILLIGRAM(S): at 03:04

## 2024-02-12 RX ADMIN — Medication 975 MILLIGRAM(S): at 10:50

## 2024-02-12 RX ADMIN — BUDESONIDE AND FORMOTEROL FUMARATE DIHYDRATE 2 PUFF(S): 160; 4.5 AEROSOL RESPIRATORY (INHALATION) at 09:53

## 2024-02-12 RX ADMIN — OXYCODONE HYDROCHLORIDE 10 MILLIGRAM(S): 5 TABLET ORAL at 01:09

## 2024-02-12 RX ADMIN — Medication 975 MILLIGRAM(S): at 09:52

## 2024-02-12 RX ADMIN — GABAPENTIN 800 MILLIGRAM(S): 400 CAPSULE ORAL at 07:05

## 2024-02-12 RX ADMIN — Medication 100 MILLIGRAM(S): at 11:45

## 2024-02-12 RX ADMIN — Medication 325 MILLIGRAM(S): at 07:07

## 2024-02-12 RX ADMIN — Medication 0.6 MILLIGRAM(S): at 11:44

## 2024-02-12 RX ADMIN — Medication 100 MILLIGRAM(S): at 07:07

## 2024-02-12 RX ADMIN — ISOSORBIDE MONONITRATE 60 MILLIGRAM(S): 60 TABLET, EXTENDED RELEASE ORAL at 11:45

## 2024-02-12 RX ADMIN — Medication 1: at 08:09

## 2024-02-12 RX ADMIN — Medication 2: at 11:44

## 2024-02-12 RX ADMIN — Medication 20 MILLIGRAM(S): at 07:07

## 2024-02-12 RX ADMIN — AMLODIPINE BESYLATE 10 MILLIGRAM(S): 2.5 TABLET ORAL at 07:07

## 2024-02-12 RX ADMIN — SACUBITRIL AND VALSARTAN 1 TABLET(S): 24; 26 TABLET, FILM COATED ORAL at 07:07

## 2024-02-12 RX ADMIN — LORATADINE 10 MILLIGRAM(S): 10 TABLET ORAL at 11:45

## 2024-02-12 RX ADMIN — OXYCODONE HYDROCHLORIDE 10 MILLIGRAM(S): 5 TABLET ORAL at 08:00

## 2024-02-12 RX ADMIN — MONTELUKAST 10 MILLIGRAM(S): 4 TABLET, CHEWABLE ORAL at 11:44

## 2024-02-12 RX ADMIN — OXYCODONE HYDROCHLORIDE 10 MILLIGRAM(S): 5 TABLET ORAL at 07:05

## 2024-02-12 RX ADMIN — PANTOPRAZOLE SODIUM 40 MILLIGRAM(S): 20 TABLET, DELAYED RELEASE ORAL at 07:07

## 2024-02-12 NOTE — PROGRESS NOTE ADULT - ATTENDING COMMENTS
Patient seen and examined. Silviano Guzmán is a 77 year old male now status post Left Total Knee Arthroplasty. No acute events overnight.    Physical Exam:  Dressing: Clean, Dry, Intact  Motor: Intact EHL/FHL/Tibialis Anterior/Gastrocnemius  Sensory: Intact Superficial Peroneal/Deep Peroneal/Saphenous/Sural/Tibial Nerves  Vascular: 2+ DP Pulse    Assessment/Plan:  Silviano Guzmán is a 77 year old male now status post Left Total Knee Arthroplasty    Plan:  -Left Lower Extremity: Weight Bearing as Tolerated  -PT/OT, Out of Bed  -DVT Prophylaxis: Aspiring 325mg twice per day  -Antibiotics: Ancef 2g x24 hours  -Pain Control  -Disposition: Pending
Patient seen and examined. Silviano Guzmán is a 77 year old male now status post Left Total Knee Arthroplasty. No acute events overnight. Pain is currently well controlled.    Physical Exam:  Dressing: Clean, Dry, Intact  Motor: Intact EHL/FHL/Tibialis Anterior/Gastrocnemius  Sensory: Intact Superficial Peroneal/Deep Peroneal/Saphenous/Sural/Tibial Nerves  Vascular: 2+ DP Pulse    Assessment/Plan:  Silviano Guzmán is a 77 year old male now status post Left Total Knee Arthroplasty    Plan:  -Left Lower Extremity: Weight Bearing as Tolerated  -PT/OT, Out of Bed  -DVT Prophylaxis: Aspirin 325mg twice per day  -Antibiotics: Ancef 2g x24 hours  -Pain Control  -Disposition: Home
Patient seen and examined. Silviano Guzmán is a 77 year old male now status post Left Total Knee Arthroplasty. No acute events overnight. Pain is currently well controlled.    Physical Exam:  Dressing: Clean, Dry, Intact  Motor: Intact EHL/FHL/Tibialis Anterior/Gastrocnemius  Sensory: Intact Superficial Peroneal/Deep Peroneal/Saphenous/Sural/Tibial Nerves  Vascular: 2+ DP Pulse    Assessment/Plan:  Silviano Guzmán is a 77 year old male now status post Left Total Knee Arthroplasty    Plan:  -Left Lower Extremity: Weight Bearing as Tolerated  -PT/OT, Out of Bed  -DVT Prophylaxis: Aspiring 325mg twice per day  -Antibiotics: Ancef 2g x24 hours  -Pain Control  -Disposition: Home

## 2024-02-12 NOTE — PROGRESS NOTE ADULT - PROBLEM SELECTOR PLAN 5
-c/w ASA BID per orthopedic protocol.    #Dispo:  -PT recommending rehab but patient wants to go home  -f/u PT recs  -d/c per primary team
-c/w ASA BID per orthopedic protocol.    6. Dispo:  -PT recommending rehab but patient wants to go home  -f/u PT recs  -d/c per primary team

## 2024-02-12 NOTE — PROGRESS NOTE ADULT - PROBLEM SELECTOR PLAN 1
s/p left total knee arthroscopy with Peña (2/7/24).  -Pain well controlled; continue multimodal pain control + bowel regimen  -c/w incentive spirometer use  -c/w PT  -Post-op labs reviewed and stable
-Pain well controlled; continue management and pain control per ortho recs with tylenol tid, neurontin tid, dilaudid prn, oxycodone IR prn and tramadol prn  -c/w bowel regimen  -c/w incentive spirometer use  -c/w PT.

## 2024-02-12 NOTE — PROGRESS NOTE ADULT - SUBJECTIVE AND OBJECTIVE BOX
ANESTHESIA POSTOP CHECK    78y Male POSTOP DAY 1 S/P L TKA    Vital Signs Last 24 Hrs  T(C): 36.7 (08 Feb 2024 09:57), Max: 37 (07 Feb 2024 16:00)  T(F): 98.1 (08 Feb 2024 09:57), Max: 98.6 (07 Feb 2024 16:00)  HR: 77 (08 Feb 2024 09:57) (56 - 77)  BP: 126/71 (08 Feb 2024 09:57) (95/46 - 126/71)  BP(mean): 73 (07 Feb 2024 18:00) (57 - 86)  RR: 18 (08 Feb 2024 09:57) (15 - 20)  SpO2: 96% (08 Feb 2024 09:57) (94% - 100%)    Parameters below as of 08 Feb 2024 09:57  Patient On (Oxygen Delivery Method): room air      I&O's Summary    07 Feb 2024 07:01  -  08 Feb 2024 07:00  --------------------------------------------------------  IN: 950 mL / OUT: 250 mL / NET: 700 mL        [x] NO APPARENT ANESTHESIA COMPLICATIONS      
ORTHO PROGRESS NOTE     Pt seen and examined at bedside, denies SOB, CP, Dizziness. N/V/D /HA.  No significant overnight events. Pain well controlled.    Vital Signs Last 24 Hrs  T(C): 36.7 (12 Feb 2024 01:19), Max: 37.1 (11 Feb 2024 18:01)  T(F): 98 (12 Feb 2024 01:19), Max: 98.7 (11 Feb 2024 18:01)  HR: 73 (12 Feb 2024 01:19) (69 - 79)  BP: 133/61 (12 Feb 2024 01:19) (124/52 - 152/62)  BP(mean): 74 (11 Feb 2024 13:00) (74 - 74)  RR: 18 (12 Feb 2024 01:19) (18 - 18)  SpO2: 96% (12 Feb 2024 01:19) (96% - 99%)    Parameters below as of 12 Feb 2024 01:19  Patient On (Oxygen Delivery Method): room air        Gen: NAD, alert and oriented  Resp: Unlabored breathing  LLE: Dressing c/d/i       SILT DP/SP/ [place]al[place]/Saph/tib       5/5 EHL 5/5 FHL 5/5 TA 5/5 Gastroc 5/5 IP        DP+,        soft compartments, no calf ttp,       Labs:              79yo Male s/p Left TKA    PLAN  - Pain control  - mechanical/DVT ppx  - OOB/PT  - WBAT LLE  - Dispo: [facility]  
Orthopedic Surgery Progress Note     S: Patient seen and examined today. No acute events overnight. Pain is well controlled. Denies f/c, chest pain, shortness of breath, dizziness.    MEDICATIONS  (STANDING):  acetaminophen     Tablet .. 975 milliGRAM(s) Oral every 8 hours  acetaminophen   IVPB .. 1000 milliGRAM(s) IV Intermittent once  allopurinol 100 milliGRAM(s) Oral daily  amLODIPine   Tablet 10 milliGRAM(s) Oral daily  aspirin enteric coated 325 milliGRAM(s) Oral two times a day  atorvastatin 80 milliGRAM(s) Oral at bedtime  budesonide 160 MICROgram(s)/formoterol 4.5 MICROgram(s) Inhaler 2 Puff(s) Inhalation two times a day  chlorhexidine 2% Cloths 1 Application(s) Topical daily  colchicine 0.6 milliGRAM(s) Oral daily  dextrose 5%. 1000 milliLiter(s) (100 mL/Hr) IV Continuous <Continuous>  dextrose 5%. 1000 milliLiter(s) (50 mL/Hr) IV Continuous <Continuous>  dextrose 50% Injectable 25 Gram(s) IV Push once  dextrose 50% Injectable 25 Gram(s) IV Push once  dextrose 50% Injectable 12.5 Gram(s) IV Push once  furosemide    Tablet 20 milliGRAM(s) Oral two times a day  gabapentin 800 milliGRAM(s) Oral three times a day  glucagon  Injectable 1 milliGRAM(s) IntraMuscular once  insulin lispro (ADMELOG) corrective regimen sliding scale   SubCutaneous three times a day before meals  insulin lispro (ADMELOG) corrective regimen sliding scale   SubCutaneous at bedtime  isosorbide   mononitrate ER Tablet (IMDUR) 30 milliGRAM(s) Oral daily  isosorbide   mononitrate ER Tablet (IMDUR) 60 milliGRAM(s) Oral daily  lactated ringers. 1000 milliLiter(s) (30 mL/Hr) IV Continuous <Continuous>  loratadine 10 milliGRAM(s) Oral daily  metoprolol succinate  milliGRAM(s) Oral daily  montelukast 10 milliGRAM(s) Oral daily  pantoprazole    Tablet 40 milliGRAM(s) Oral before breakfast  polyethylene glycol 3350 17 Gram(s) Oral at bedtime  sacubitril 24 mG/valsartan 26 mG 1 Tablet(s) Oral two times a day  senna 2 Tablet(s) Oral at bedtime  tamsulosin 0.4 milliGRAM(s) Oral at bedtime    MEDICATIONS  (PRN):  albuterol    90 MICROgram(s) HFA Inhaler 2 Puff(s) Inhalation every 6 hours PRN for bronchospasm  dextrose Oral Gel 15 Gram(s) Oral once PRN Blood Glucose LESS THAN 70 milliGRAM(s)/deciliter  HYDROmorphone  Injectable 0.5 milliGRAM(s) IV Push once PRN Severe Pain (7 - 10)  ondansetron Injectable 4 milliGRAM(s) IV Push every 6 hours PRN Nausea and/or Vomiting  oxyCODONE    IR 5 milliGRAM(s) Oral every 4 hours PRN Moderate Pain (4 - 6)  oxyCODONE    IR 10 milliGRAM(s) Oral every 4 hours PRN Severe Pain (7 - 10)  traMADol 50 milliGRAM(s) Oral every 6 hours PRN Mild Pain (1 - 3)      Physical Exam:  Gen: NAD  LLE:  Dressing c/d/i  +EHL/FHL/TA/GS  SILT S/S/SP/DP  +DP Pulses  Compartments soft  No calf TTP     Vital Signs Last 24 Hrs  T(C): 36.8 (10 Feb 2024 06:00), Max: 37.4 (09 Feb 2024 17:48)  T(F): 98.3 (10 Feb 2024 06:00), Max: 99.3 (09 Feb 2024 17:48)  HR: 69 (10 Feb 2024 06:00) (66 - 72)  BP: 130/66 (10 Feb 2024 06:00) (115/53 - 141/62)  BP(mean): --  RR: 18 (10 Feb 2024 06:00) (16 - 18)  SpO2: 99% (10 Feb 2024 06:00) (94% - 100%)    Parameters below as of 10 Feb 2024 06:00  Patient On (Oxygen Delivery Method): room air        02-08-24 @ 07:01  -  02-09-24 @ 07:00  --------------------------------------------------------  IN: 0 mL / OUT: 1350 mL / NET: -1350 mL    02-09-24 @ 07:01  -  02-10-24 @ 06:03  --------------------------------------------------------  IN: 0 mL / OUT: 1150 mL / NET: -1150 mL        LABS:        
Pt seen/examined. Doing well. Pain controlled. No acute overnight complaints or events.    T(C): 36.9 (02-08-24 @ 06:12), Max: 37 (02-07-24 @ 16:00)  HR: 72 (02-08-24 @ 06:12) (56 - 72)  BP: 113/66 (02-08-24 @ 06:12) (95/46 - 125/63)  RR: 16 (02-08-24 @ 06:12) (15 - 20)  SpO2: 97% (02-08-24 @ 06:12) (94% - 100%)  Wt(kg): --    Gen: awake, alert, NAD  Resp: no increased work of breathing  LLE:  Dressing c/d/i  +EHL/FHL/TA/GS  SILT S/S/SP/DP  +DP Pulses  Compartments soft  No calf TTP     A/P: 77yo Male s/p right TKA, POD#1    - Pain control  - mechanical/DVT ppx  - OOB/PT  - WBAT RLE  - FU AM labs  - Dispo planning
Pt seen/examined. Doing well. Pain controlled. No acute overnight complaints or events.    T(C): 36.7 (02-09-24 @ 05:27), Max: 37.4 (02-09-24 @ 02:00)  HR: 79 (02-09-24 @ 05:27) (68 - 79)  BP: 150/65 (02-09-24 @ 05:27) (125/78 - 150/65)  RR: 16 (02-09-24 @ 05:27) (16 - 18)  SpO2: 95% (02-09-24 @ 05:27) (94% - 98%)  Wt(kg): --    Gen: awake, alert, NAD  Resp: no increased work of breathing  LLE:  Dressing c/d/i  +EHL/FHL/TA/GS  SILT S/S/SP/DP  +DP Pulses  Compartments soft  No calf TTP                         11.6   8.01  )-----------( 116      ( 08 Feb 2024 05:25 )             36.0       02-08    135  |  100  |  14  ----------------------------<  173<H>  4.3   |  24  |  1.09          A/P: 79yo Male s/p Left TKA, POD#2    - Pain control  - mechanical/DVT ppx  - OOB/PT  - WBAT LLE  - Dispo planning: rehab
ORTHOPAEDIC PROGRESS NOTE    SUBJECTIVE:  Pt seen and examined at bedside this am.  Doing well.  No acute events overnight.  Pt states pain is well controlled    OBJECTIVE:  Vital Signs Last 24 Hrs  T(C): 37.2 (11 Feb 2024 01:54), Max: 37.2 (11 Feb 2024 01:54)  T(F): 99 (11 Feb 2024 01:54), Max: 99 (11 Feb 2024 01:54)  HR: 76 (11 Feb 2024 01:54) (70 - 77)  BP: 126/65 (11 Feb 2024 01:54) (126/65 - 137/56)  BP(mean): --  RR: 16 (11 Feb 2024 01:54) (16 - 18)  SpO2: 95% (11 Feb 2024 01:54) (95% - 98%)    Parameters below as of 11 Feb 2024 01:54  Patient On (Oxygen Delivery Method): room air        Physical Exam:  General: NAD; resting comfrotably in bed  Resp: non labored  LLE:  Dressing c/d/i  +EHL/FHL/TA/GS  SILT S/S/SP/DP  +DP Pulses  Compartments soft  No calf TTP     LABS                  I&O's Summary    09 Feb 2024 07:01  -  10 Feb 2024 07:00  --------------------------------------------------------  IN: 0 mL / OUT: 1450 mL / NET: -1450 mL    10 Feb 2024 07:01  -  11 Feb 2024 06:32  --------------------------------------------------------  IN: 0 mL / OUT: 400 mL / NET: -400 mL          
CHIEF COMPLAINT: f/u Left Knee arthroscopy    SUBJECTIVE / OVERNIGHT EVENTS: Patient seen and examined. No acute events overnight. Pain well controlled and patient without any complaints.    MEDICATIONS  (STANDING):  acetaminophen     Tablet .. 975 milliGRAM(s) Oral every 8 hours  acetaminophen   IVPB .. 1000 milliGRAM(s) IV Intermittent once  allopurinol 100 milliGRAM(s) Oral daily  amLODIPine   Tablet 10 milliGRAM(s) Oral daily  aspirin 325 milliGRAM(s) Oral two times a day  atorvastatin 80 milliGRAM(s) Oral at bedtime  budesonide 160 MICROgram(s)/formoterol 4.5 MICROgram(s) Inhaler 2 Puff(s) Inhalation two times a day  chlorhexidine 2% Cloths 1 Application(s) Topical daily  colchicine 0.6 milliGRAM(s) Oral daily  dextrose 5%. 1000 milliLiter(s) (50 mL/Hr) IV Continuous <Continuous>  dextrose 5%. 1000 milliLiter(s) (100 mL/Hr) IV Continuous <Continuous>  dextrose 50% Injectable 25 Gram(s) IV Push once  dextrose 50% Injectable 25 Gram(s) IV Push once  dextrose 50% Injectable 12.5 Gram(s) IV Push once  furosemide    Tablet 20 milliGRAM(s) Oral two times a day  gabapentin 800 milliGRAM(s) Oral three times a day  glucagon  Injectable 1 milliGRAM(s) IntraMuscular once  insulin lispro (ADMELOG) corrective regimen sliding scale   SubCutaneous three times a day before meals  insulin lispro (ADMELOG) corrective regimen sliding scale   SubCutaneous at bedtime  isosorbide   mononitrate ER Tablet (IMDUR) 60 milliGRAM(s) Oral daily  isosorbide   mononitrate ER Tablet (IMDUR) 30 milliGRAM(s) Oral daily  lactated ringers. 1000 milliLiter(s) (30 mL/Hr) IV Continuous <Continuous>  loratadine 10 milliGRAM(s) Oral daily  metoprolol succinate  milliGRAM(s) Oral daily  montelukast 10 milliGRAM(s) Oral daily  pantoprazole    Tablet 40 milliGRAM(s) Oral before breakfast  polyethylene glycol 3350 17 Gram(s) Oral at bedtime  sacubitril 24 mG/valsartan 26 mG 1 Tablet(s) Oral two times a day  senna 2 Tablet(s) Oral at bedtime  tamsulosin 0.4 milliGRAM(s) Oral at bedtime    MEDICATIONS  (PRN):  albuterol    90 MICROgram(s) HFA Inhaler 2 Puff(s) Inhalation every 6 hours PRN for bronchospasm  dextrose Oral Gel 15 Gram(s) Oral once PRN Blood Glucose LESS THAN 70 milliGRAM(s)/deciliter  HYDROmorphone  Injectable 0.5 milliGRAM(s) IV Push once PRN Severe Pain (7 - 10)  ondansetron Injectable 4 milliGRAM(s) IV Push every 6 hours PRN Nausea and/or Vomiting  oxyCODONE    IR 10 milliGRAM(s) Oral every 4 hours PRN Severe Pain (7 - 10)  oxyCODONE    IR 5 milliGRAM(s) Oral every 4 hours PRN Moderate Pain (4 - 6)  traMADol 50 milliGRAM(s) Oral every 6 hours PRN Mild Pain (1 - 3)      VITALS:  T(F): 98.5 (02-09-24 @ 09:43), Max: 99.4 (02-09-24 @ 02:00)  HR: 66 (02-09-24 @ 09:43) (66 - 79)  BP: 140/56 (02-09-24 @ 09:43) (125/78 - 150/65)  RR: 16 (02-09-24 @ 09:43) (16 - 17)  SpO2: 100% (02-09-24 @ 09:43)    PHYSICAL EXAM:  GENERAL: NAD  CHEST/LUNG: Clear to auscultation bilaterally; No wheeze  HEART: Regular rate and rhythm; No murmurs, rubs, or gallops  ABDOMEN: Soft, Nontender, Nondistended; Bowel sounds present  EXTREMITIES:  2+ Peripheral Pulses, No clubbing, cyanosis, or edema  SKIN: Dressing C/D/I    LABS:              11.6                 135  | 24   | 14           8.01  >-----------< 116     ------------------------< 173                   36.0                 4.3  | 100  | 1.09                                         Ca 8.5   Mg x     Ph x        CAPILLARY BLOOD GLUCOSE  POCT Blood Glucose.: 200 mg/dL (09 Feb 2024 10:48)  POCT Blood Glucose.: 168 mg/dL (09 Feb 2024 07:28)  POCT Blood Glucose.: 177 mg/dL (08 Feb 2024 22:05)  POCT Blood Glucose.: 183 mg/dL (08 Feb 2024 16:21)  POCT Blood Glucose.: 166 mg/dL (08 Feb 2024 11:15)    Urinalysis Basic - ( 08 Feb 2024 05:25 )  Color: x / Appearance: x / SG: x / pH: x  Gluc: 173 mg/dL / Ketone: x  / Bili: x / Urobili: x   Blood: x / Protein: x / Nitrite: x   Leuk Esterase: x / RBC: x / WBC x   Sq Epi: x / Non Sq Epi: x / Bacteria: x    [ ] Consultant(s) Notes Reviewed:  [x] Care Discussed with Consultants/Other Providers: Orthopedic PA - discussed disposition
CHIEF COMPLAINT: f/u     SUBJECTIVE / OVERNIGHT EVENTS: Patient seen and examined. No acute events overnight. Reports feeling sore but that he was able to get up with PT and walk today.     MEDICATIONS  (STANDING):  acetaminophen     Tablet .. 975 milliGRAM(s) Oral every 8 hours  acetaminophen   IVPB .. 1000 milliGRAM(s) IV Intermittent once  allopurinol 100 milliGRAM(s) Oral daily  amLODIPine   Tablet 10 milliGRAM(s) Oral daily  aspirin enteric coated 325 milliGRAM(s) Oral two times a day  atorvastatin 80 milliGRAM(s) Oral at bedtime  budesonide 160 MICROgram(s)/formoterol 4.5 MICROgram(s) Inhaler 2 Puff(s) Inhalation two times a day  chlorhexidine 2% Cloths 1 Application(s) Topical daily  colchicine 0.6 milliGRAM(s) Oral daily  dextrose 5%. 1000 milliLiter(s) (50 mL/Hr) IV Continuous <Continuous>  dextrose 5%. 1000 milliLiter(s) (100 mL/Hr) IV Continuous <Continuous>  dextrose 50% Injectable 25 Gram(s) IV Push once  dextrose 50% Injectable 25 Gram(s) IV Push once  dextrose 50% Injectable 12.5 Gram(s) IV Push once  furosemide    Tablet 20 milliGRAM(s) Oral two times a day  gabapentin 800 milliGRAM(s) Oral three times a day  glucagon  Injectable 1 milliGRAM(s) IntraMuscular once  insulin lispro (ADMELOG) corrective regimen sliding scale   SubCutaneous three times a day before meals  insulin lispro (ADMELOG) corrective regimen sliding scale   SubCutaneous at bedtime  isosorbide   mononitrate ER Tablet (IMDUR) 60 milliGRAM(s) Oral daily  isosorbide   mononitrate ER Tablet (IMDUR) 30 milliGRAM(s) Oral daily  lactated ringers. 1000 milliLiter(s) (30 mL/Hr) IV Continuous <Continuous>  loratadine 10 milliGRAM(s) Oral daily  metoprolol succinate  milliGRAM(s) Oral daily  montelukast 10 milliGRAM(s) Oral daily  pantoprazole    Tablet 40 milliGRAM(s) Oral before breakfast  polyethylene glycol 3350 17 Gram(s) Oral at bedtime  sacubitril 24 mG/valsartan 26 mG 1 Tablet(s) Oral two times a day  senna 2 Tablet(s) Oral at bedtime  tamsulosin 0.4 milliGRAM(s) Oral at bedtime    MEDICATIONS  (PRN):  albuterol    90 MICROgram(s) HFA Inhaler 2 Puff(s) Inhalation every 6 hours PRN for bronchospasm  dextrose Oral Gel 15 Gram(s) Oral once PRN Blood Glucose LESS THAN 70 milliGRAM(s)/deciliter  HYDROmorphone  Injectable 0.5 milliGRAM(s) IV Push once PRN Severe Pain (7 - 10)  ondansetron Injectable 4 milliGRAM(s) IV Push every 6 hours PRN Nausea and/or Vomiting  oxyCODONE    IR 5 milliGRAM(s) Oral every 4 hours PRN Moderate Pain (4 - 6)  oxyCODONE    IR 10 milliGRAM(s) Oral every 4 hours PRN Severe Pain (7 - 10)  traMADol 50 milliGRAM(s) Oral every 6 hours PRN Mild Pain (1 - 3)      VITALS:  T(F): 98.2 (02-12-24 @ 09:25), Max: 98.7 (02-11-24 @ 18:01)  HR: 78 (02-12-24 @ 09:25) (66 - 79)  BP: 137/77 (02-12-24 @ 09:25) (133/61 - 152/62)  RR: 18 (02-12-24 @ 09:25) (18 - 18)  SpO2: 100% (02-12-24 @ 09:25)  Wt(kg): --      PHYSICAL EXAM:  GENERAL: NAD, well-developed  EYES: conjunctiva and sclera clear  CHEST/LUNG: Clear to auscultation bilaterally; No wheeze  HEART: Regular rate and rhythm; No murmurs, rubs, or gallops  ABDOMEN: Soft, Nontender, Nondistended; Bowel sounds present  EXTREMITIES: LLE in knee brace, compartments soft, ROM limited.   PSYCH: AAOx3  NEUROLOGY: hard of hearing       LABS:    [x] Care Discussed with Consultants/Other Providers: Orthopedic PA - discussed

## 2024-02-12 NOTE — PROGRESS NOTE ADULT - PROBLEM SELECTOR PLAN 4
-c/w home meds with entresto, metoprolol and lasix.
-c/w home meds with entresto, metoprolol and lasix.  -No signs of volume overload

## 2024-02-12 NOTE — PROGRESS NOTE ADULT - PROBLEM SELECTOR PLAN 2
-UbD5z=8.6%.   -Noted to be hyperglycemic, FS 190s-250s   -If patient is being discharged, can continue home meds on discharge. If staying overnight, would recommend starting Lantus 5U qHS + ISS  -Monitor FS and will adjust regimen as needed
-NrQ1j=8.6%. FS well controlled. Continue with ISS for now and continue to monitor FS closely.

## 2024-02-12 NOTE — PROGRESS NOTE ADULT - ASSESSMENT
79yo Male s/p Left TKA    PLAN  - Pain control  - mechanical/DVT ppx  - OOB/PT  - WBAT LLE  - Dispo: [facility]  
A/P: 77yo Male s/p Left TKA, POD#3    - Pain control  - mechanical/DVT ppx  - OOB/PT  - WBAT LLE  - Dispo planning: pending [facility] Noel MD  Orthopaedic Surgery Resident    For all questions, please reach out via the following numbers for the on-call resident; do not reach out via Teams.  [facility] [id number]  [facility]        [id number]  [facility]  [id number]/ [phone]  
78M h/o DM, CHF, HTN, GERD and asthma p/w unilateral primary osteoarthritis of left knee now s/p left total knee arthroscopy with Peña (2/7/24).
78M hx of T2DM, CHF, HTN, GERD and asthma p/w unilateral primary osteoarthritis of left knee now s/p left total knee arthroscopy with Peña (2/7/24).

## 2024-02-13 ENCOUNTER — NON-APPOINTMENT (OUTPATIENT)
Age: 79
End: 2024-02-13

## 2024-02-22 ENCOUNTER — APPOINTMENT (OUTPATIENT)
Dept: ORTHOPEDIC SURGERY | Facility: CLINIC | Age: 79
End: 2024-02-22
Payer: MEDICARE

## 2024-02-22 PROCEDURE — 99024 POSTOP FOLLOW-UP VISIT: CPT

## 2024-02-22 PROCEDURE — 73562 X-RAY EXAM OF KNEE 3: CPT | Mod: LT

## 2024-02-25 NOTE — REVIEW OF SYSTEMS
[Joint Pain] : joint pain [Negative] : Endocrine [Joint Stiffness] : no joint stiffness [Fever] : no fever [Chills] : no chills [Joint Swelling] : no joint swelling

## 2024-02-25 NOTE — DISCUSSION/SUMMARY
[de-identified] : Silviano Guzmán is a 78-year-old male who presented to the office for follow-up of his right total knee arthroplasty.  Patient underwent right TKA on 2/7/2024.  He is doing well overall. XRays showed left total knee arthroplasty in good position and alignment. Examination showed range of motion -5 to 110. Discussed with the patient the examination and imaging findings. Discussed the management of total knee arthroplasty at this time, including physical therapy and DVT prophylaxis. Patient was given a referral to physical therapy. Patient will continued to take Aspirin twice daily for a total of 6 weeks for DVT prophylaxis. Dressing was removed. Discussed that patient may shower, but should not soak the wound. Patient will follow up in 4 weeks for reevaluation and management. Patient understanding and in agreement with the plan. All questions answered.   Plan: -Physical Therapy -DVT Prophylaxis: Aspirin twice daily for a total of 6 weeks -Patient may shower, but should not soak the wound -Follow up in 4 weeks for reevaluation and management

## 2024-02-25 NOTE — HISTORY OF PRESENT ILLNESS
[de-identified] : 2/22/2024 [facility]  ID: [id number]  Silviano Guzmán presented to the office for follow-up of his left total knee arthroplasty.  Patient underwent left TKA on 2/7/2024.  He is currently doing well overall.  He does have some knee pain.  Patient has been in home physical therapy.  He is taking his aspirin for DVT prophylaxis.  No falls.  No fevers or chills.  1/11/2024 [facility]  ID: [id number]  Silviano Guzmán presents to the office for follow-up of his bilateral knee pain.  Patient continues to have bilateral (left greater than right) knee pain.  Pain can cause difficulty walking.  He has tried Tylenol and meloxicam.  Patient tried prior injections.  No falls.  No fevers or chills.  11/30/2023 [facility]  ID: [id number]  Silviano Guzmán presented to the office for follow-up of his bilateral knee pain.  Patient states that he has experienced continued bilateral knee pain (left greater than right.  He states that he has pain with walking.  Pain is worse over the medial knee.  Patient has tried physical therapy.  He has tried Tylenol and meloxicam, with some relief.  He states that the prior injection helped for about 1 to 2 weeks.  No fevers or chills.  No falls or injuries.  8/29/2023 [facility]  ID: [id number]  Silviano Guzmán presents to the office for follow-up of his bilateral knee pain.  Patient continues to have bilateral (left greater than right) knee pain.  He reports difficulty with walking.  Prior viscosupplementation injections only helped for about 2 weeks.  Pain is worse with laying down and with walking.  He has tried multiple injections previously.  He has tried Tylenol and diclofenac, which gives some relief.  He has tried physical therapy, which gives temporary relief.  Patient underwent recent lumbar injection on 8/18, which helped with his back pain.  He can have some lateral hip pain.  No groin pain.  5/25/2023 [facility]  ID: [id number]  Silviano Guzmán presents to the office for follow-up of his bilateral knee pain.  Patient continues to have some bilateral knee pain.  He tolerated the injections well.  His pain has somewhat improved following viscosupplementation injection .  No fevers or chills.  Patient presents for his third bilateral knee viscosupplementation injections.   5/18/2023 [facility]  ID: [id number]  Silviano Guzmán presents to the office for follow-up of his bilateral knee pain.  Patient continues to have some bilateral knee pain.  He tolerated the injections well.  His pain has somewhat improved following viscosupplementation injection .  No fevers or chills.  Patient presents for his second bilateral knee viscosupplementation injections.   5/11/2023  Mr. Guzmán presented to the office for follow up of his bilateral knee pain. Patient continues to have bilateral knee pain (left greater than right). He reports that his pain is worse after drinking alcohol (patient has a history of gout). There have been no falls or injuries. No fevers or chills. Patient presents for viscosupplementation injections.  4/25/2023  Mr. Guzmán is a 77-year-old male who presents to the office for follow-up of his bilateral knee pain.  Patient has been experiencing bilateral (left greater than right) knee pain for several years.  He was previously seen in the office and referred to physical therapy.  He has been in physical therapy, which helps while he is at physical therapy, but his pain returns when he goes home.  He states that overall, his pain is unchanged.  Pain is located over the anterior knee and radiates down the leg to the foot.  He underwent recent lumbar spine MRI by Dr. Barker.  No numbness or tingling.  He does have some tenderness over the knee and lower leg.  Patient has previously tried diclofenac and multiple injections for his knee pain.  3/31/2023 Mr. Guzmán is a 77-year-old male who presents to the office for evaluation of his bilateral knee pain.  Patient has been experiencing bilateral (left greater than right) knee pain for several years, but has been worsening over the last month.  He states that he has significant pain in that it can affect his sleep.  Pain is located over the medial, lateral, and anterior knees bilaterally.  Pain can radiate to the foot.  He also reports some crepitus.  Pain is worse at night and with walking.  He has not noted any relieving factors at this time.  He has tried diclofenac and Tylenol, without significant relief.  Patient has tried physical therapy, last about 1 month ago.  He has tried injections, last on 1/25/2023.  Patient was getting injections every 6 months with about 7 injections in the left knee and 2 injections in the right knee.  His last injection helped for about 2 to 3 weeks.  He is also previously had a knee aspiration, which helped.  Pain is now affecting his quality of life and causing difficulty sleeping and resting.  Patient has been using a cane for the last 3 to 4 years.  He does have a history of lumbar radiculopathy and was referred by his primary care physician to pain management for potential epidural injections.  Patient also has a history of a right knee fracture in 1980 that was treated nonoperatively.  History: CAD s/p cath, CHF, HTN, HLD, ASHOK, BPH, Gout, Lumbar Radiculopathy, Diabetes

## 2024-02-25 NOTE — PHYSICAL EXAM
[de-identified] : Constitutional:  78 year old male, alert and oriented, cooperative, in no acute distress.  HEENT  NC/AT.  Appearance: symmetric  Neck/Back Straight without deformity or instability.  Good ROM.  Chest/Respiratory  Respiratory effort: no intercostal retractions or use of accessory muscles. Nonlabored Breathing  Skin  On inspection, warm and dry without rashes or lesions.  Mental Status:  Judgment, insight: intact Orientation: oriented to time, place, and person  Neurological: Sensory and Motor are grossly intact throughout  Left Knee  Inspection:     Incision well healing. No erythema or drainage     No Effusion     Tenderness over the medial and lateral knee  Range of Motion: 	Extension - -5 degrees 	Flexion - 110 degrees 	Alignment - Valgus 3 degrees 	Extensor lag: None  Stability:      Demonstrates no Varus or Valgus instability      Negative Anterior or Posterior drawer.      No mid flexion instability  Patella: stable, tracks well.   Neurologic Exam     Motor intact including 5/5 Extensor Hallucis Longus, 5/5 Flexor Hallucis Longus, 5/5 Tibialis Anterior and 5/5 Gastrocnemius     Sensation Intact to Light Touch including Saphenous, Sural, Superficial Peroneal, Deep Peroneal, Tibial nerve distributions  Vascular Exam     Foot is warm and well perfused with 2+ Dorsalis Pedis Pulse   No pain with range of motion of the bilateral hips or right knee. No lumbar paraspinal muscle tenderness.  [de-identified] : XRay:  XRays of the Left Knee (3 Views) taken in the office today and reviewed with the patient. XRays demonstrate a Left Total Knee Arthroplasty in good position and alignment. There is no obvious evidence of fracture, dislocation, osteolysis or loosening. (my personal interpretation) Components: [employer] CS Cemented

## 2024-03-12 RX ORDER — ASPIRIN 325 MG/1
325 TABLET, COATED ORAL
Qty: 14 | Refills: 0 | Status: ACTIVE | COMMUNITY
Start: 2024-03-12 | End: 1900-01-01

## 2024-03-21 ENCOUNTER — APPOINTMENT (OUTPATIENT)
Dept: ORTHOPEDIC SURGERY | Facility: CLINIC | Age: 79
End: 2024-03-21
Payer: MEDICARE

## 2024-03-21 PROCEDURE — 99024 POSTOP FOLLOW-UP VISIT: CPT

## 2024-03-21 PROCEDURE — 73562 X-RAY EXAM OF KNEE 3: CPT | Mod: LT

## 2024-03-27 NOTE — PHYSICAL EXAM
[de-identified] : Constitutional:  78 year old male, alert and oriented, cooperative, in no acute distress.  HEENT  NC/AT.  Appearance: symmetric  Neck/Back Straight without deformity or instability.  Good ROM.  Chest/Respiratory  Respiratory effort: no intercostal retractions or use of accessory muscles. Nonlabored Breathing  Skin  On inspection, warm and dry without rashes or lesions.  Mental Status:  Judgment, insight: intact Orientation: oriented to time, place, and person  Neurological: Sensory and Motor are grossly intact throughout  Left Knee  Inspection:     Incision well healing. No erythema or drainage     No Effusion     Tenderness over the medial and lateral knee  Range of Motion: 	Extension - -5 degrees 	Flexion - 120 degrees 	Alignment - Valgus 3 degrees 	Extensor lag: None  Stability:      Demonstrates no Varus or Valgus instability      Negative Anterior or Posterior drawer.      No mid flexion instability  Patella: stable, tracks well.   Neurologic Exam     Motor intact including 5/5 Extensor Hallucis Longus, 5/5 Flexor Hallucis Longus, 5/5 Tibialis Anterior and 5/5 Gastrocnemius     Sensation Intact to Light Touch including Saphenous, Sural, Superficial Peroneal, Deep Peroneal, Tibial nerve distributions  Vascular Exam     Foot is warm and well perfused with 2+ Dorsalis Pedis Pulse   No pain with range of motion of the bilateral hips or right knee. No lumbar paraspinal muscle tenderness.  [de-identified] : XRay:  XRays of the Left Knee (3 Views) taken in the office today and reviewed with the patient. XRays demonstrate a Left Total Knee Arthroplasty in good position and alignment. There is no obvious evidence of fracture, dislocation, osteolysis or loosening. (my personal interpretation) Components: [employer] CS Cemented

## 2024-03-27 NOTE — DISCUSSION/SUMMARY
[de-identified] : Silviano Guzmán is a 78-year-old male who presented to the office for follow-up of his right total knee arthroplasty.  Patient underwent right TKA on 2/7/2024.  He is doing well overall. XRays showed left total knee arthroplasty in good position and alignment. Examination showed range of motion -5 to 120. Discussed with the patient the examination and imaging findings. Discussed the management of total knee arthroplasty at this time, including physical therapy. Patient will continue physical therapy. Patient has completed DVT prophylaxis. Patient will follow up in 6 weeks for reevaluation and management. Patient understanding and in agreement with the plan. All questions answered.     Plan: -Physical Therapy -DVT Prophylaxis: Completed -Follow up in 6 weeks for reevaluation and management

## 2024-03-27 NOTE — HISTORY OF PRESENT ILLNESS
[de-identified] : 3/21/2024 [facility]  ID: [id number]  Silviano Guzmán presents to the office for follow-up of his left total knee arthroplasty.  Patient underwent left TKA on 2/7/2024.  He is currently doing well overall.  Patient does have some constipation.  His pain is improving, but he still has some pain.  He is walking with a cane.  No falls.  No fevers or chills.  Patient has completed his DVT prophylaxis.  2/22/2024 [facility]  ID: [id number]  Silviano Guzmán presented to the office for follow-up of his left total knee arthroplasty.  Patient underwent left TKA on 2/7/2024.  He is currently doing well overall.  He does have some knee pain.  Patient has been in home physical therapy.  He is taking his aspirin for DVT prophylaxis.  No falls.  No fevers or chills.  1/11/2024 [facility]  ID: [id number]  Silviano Guzmán presents to the office for follow-up of his bilateral knee pain.  Patient continues to have bilateral (left greater than right) knee pain.  Pain can cause difficulty walking.  He has tried Tylenol and meloxicam.  Patient tried prior injections.  No falls.  No fevers or chills.  11/30/2023 [facility]  ID: [id number]  Silviano Guzmán presented to the office for follow-up of his bilateral knee pain.  Patient states that he has experienced continued bilateral knee pain (left greater than right.  He states that he has pain with walking.  Pain is worse over the medial knee.  Patient has tried physical therapy.  He has tried Tylenol and meloxicam, with some relief.  He states that the prior injection helped for about 1 to 2 weeks.  No fevers or chills.  No falls or injuries.  8/29/2023 [facility]  ID: [id number]  Silviano Guzmán presents to the office for follow-up of his bilateral knee pain.  Patient continues to have bilateral (left greater than right) knee pain.  He reports difficulty with walking.  Prior viscosupplementation injections only helped for about 2 weeks.  Pain is worse with laying down and with walking.  He has tried multiple injections previously.  He has tried Tylenol and diclofenac, which gives some relief.  He has tried physical therapy, which gives temporary relief.  Patient underwent recent lumbar injection on 8/18, which helped with his back pain.  He can have some lateral hip pain.  No groin pain.  5/25/2023 [facility]  ID: [id number]  Silviano Guzmán presents to the office for follow-up of his bilateral knee pain.  Patient continues to have some bilateral knee pain.  He tolerated the injections well.  His pain has somewhat improved following viscosupplementation injection .  No fevers or chills.  Patient presents for his third bilateral knee viscosupplementation injections.   5/18/2023 [facility]  ID: [id number]  Silviano Guzmán presents to the office for follow-up of his bilateral knee pain.  Patient continues to have some bilateral knee pain.  He tolerated the injections well.  His pain has somewhat improved following viscosupplementation injection .  No fevers or chills.  Patient presents for his second bilateral knee viscosupplementation injections.   5/11/2023  Mr. Guzmán presented to the office for follow up of his bilateral knee pain. Patient continues to have bilateral knee pain (left greater than right). He reports that his pain is worse after drinking alcohol (patient has a history of gout). There have been no falls or injuries. No fevers or chills. Patient presents for viscosupplementation injections.  4/25/2023  Mr. Guzmán is a 77-year-old male who presents to the office for follow-up of his bilateral knee pain.  Patient has been experiencing bilateral (left greater than right) knee pain for several years.  He was previously seen in the office and referred to physical therapy.  He has been in physical therapy, which helps while he is at physical therapy, but his pain returns when he goes home.  He states that overall, his pain is unchanged.  Pain is located over the anterior knee and radiates down the leg to the foot.  He underwent recent lumbar spine MRI by Dr. Barker.  No numbness or tingling.  He does have some tenderness over the knee and lower leg.  Patient has previously tried diclofenac and multiple injections for his knee pain.  3/31/2023 Mr. Guzmán is a 77-year-old male who presents to the office for evaluation of his bilateral knee pain.  Patient has been experiencing bilateral (left greater than right) knee pain for several years, but has been worsening over the last month.  He states that he has significant pain in that it can affect his sleep.  Pain is located over the medial, lateral, and anterior knees bilaterally.  Pain can radiate to the foot.  He also reports some crepitus.  Pain is worse at night and with walking.  He has not noted any relieving factors at this time.  He has tried diclofenac and Tylenol, without significant relief.  Patient has tried physical therapy, last about 1 month ago.  He has tried injections, last on 1/25/2023.  Patient was getting injections every 6 months with about 7 injections in the left knee and 2 injections in the right knee.  His last injection helped for about 2 to 3 weeks.  He is also previously had a knee aspiration, which helped.  Pain is now affecting his quality of life and causing difficulty sleeping and resting.  Patient has been using a cane for the last 3 to 4 years.  He does have a history of lumbar radiculopathy and was referred by his primary care physician to pain management for potential epidural injections.  Patient also has a history of a right knee fracture in 1980 that was treated nonoperatively.  History: CAD s/p cath, CHF, HTN, HLD, ASHOK, BPH, Gout, Lumbar Radiculopathy, Diabetes

## 2024-05-02 ENCOUNTER — APPOINTMENT (OUTPATIENT)
Dept: ORTHOPEDIC SURGERY | Facility: CLINIC | Age: 79
End: 2024-05-02
Payer: MEDICARE

## 2024-05-02 VITALS
SYSTOLIC BLOOD PRESSURE: 127 MMHG | OXYGEN SATURATION: 97 % | DIASTOLIC BLOOD PRESSURE: 70 MMHG | BODY MASS INDEX: 32.93 KG/M2 | WEIGHT: 230 LBS | HEART RATE: 75 BPM | HEIGHT: 70 IN

## 2024-05-02 DIAGNOSIS — Z96.659 PRESENCE OF UNSPECIFIED ARTIFICIAL KNEE JOINT: ICD-10-CM

## 2024-05-02 PROCEDURE — 99024 POSTOP FOLLOW-UP VISIT: CPT

## 2024-05-02 NOTE — PHYSICAL EXAM
[de-identified] : Constitutional:  78 year old male, alert and oriented, cooperative, in no acute distress.  HEENT  NC/AT.  Appearance: symmetric  Neck/Back Straight without deformity or instability.  Good ROM.  Chest/Respiratory  Respiratory effort: no intercostal retractions or use of accessory muscles. Nonlabored Breathing  Skin  On inspection, warm and dry without rashes or lesions.  Mental Status:  Judgment, insight: intact Orientation: oriented to time, place, and person  Neurological: Sensory and Motor are grossly intact throughout  Left Knee  Inspection:     Incision well healing. No erythema or drainage     No Effusion     Tenderness over the medial and lateral knee  Range of Motion: 	Extension - 0 degrees 	Flexion - 120 degrees 	Alignment - Valgus 3 degrees 	Extensor lag: None  Stability:      Demonstrates no Varus or Valgus instability      Negative Anterior or Posterior drawer.      No mid flexion instability  Patella: stable, tracks well.   Neurologic Exam     Motor intact including 5/5 Extensor Hallucis Longus, 5/5 Flexor Hallucis Longus, 5/5 Tibialis Anterior and 5/5 Gastrocnemius     Sensation Intact to Light Touch including Saphenous, Sural, Superficial Peroneal, Deep Peroneal, Tibial nerve distributions  Vascular Exam     Foot is warm and well perfused with 2+ Dorsalis Pedis Pulse   No pain with range of motion of the bilateral hips or right knee. No lumbar paraspinal muscle tenderness.  [de-identified] : XRay:  XRays of the Left Knee (3 Views) taken on 3/21/2024. XRays demonstrate a Left Total Knee Arthroplasty in good position and alignment. There is no obvious evidence of fracture, dislocation, osteolysis or loosening. (my personal interpretation) Components: [employer] CS Cemented

## 2024-05-02 NOTE — HISTORY OF PRESENT ILLNESS
[de-identified] : 5/2/2024  Silviano Guzmán presents to the office for follow-up of his left total knee arthroplasty.  Patient underwent left TKA on 2/7/2024.  He is currently doing well overall.  He is walking without assistive devices.  Patient has been in physical therapy.  No falls.  No fevers or chills.  3/21/2024 [facility]  ID: [id number]  Silviano Guzmán presents to the office for follow-up of his left total knee arthroplasty.  Patient underwent left TKA on 2/7/2024.  He is currently doing well overall.  Patient does have some constipation.  His pain is improving, but he still has some pain.  He is walking with a cane.  No falls.  No fevers or chills.  Patient has completed his DVT prophylaxis.  2/22/2024 [facility]  ID: [id number]  Silviano Guzmán presented to the office for follow-up of his left total knee arthroplasty.  Patient underwent left TKA on 2/7/2024.  He is currently doing well overall.  He does have some knee pain.  Patient has been in home physical therapy.  He is taking his aspirin for DVT prophylaxis.  No falls.  No fevers or chills.  1/11/2024 [facility]  ID: [id number]  Silviano Guzmán presents to the office for follow-up of his bilateral knee pain.  Patient continues to have bilateral (left greater than right) knee pain.  Pain can cause difficulty walking.  He has tried Tylenol and meloxicam.  Patient tried prior injections.  No falls.  No fevers or chills.  11/30/2023 [facility]  ID: [id number]  Silviano Guzmán presented to the office for follow-up of his bilateral knee pain.  Patient states that he has experienced continued bilateral knee pain (left greater than right.  He states that he has pain with walking.  Pain is worse over the medial knee.  Patient has tried physical therapy.  He has tried Tylenol and meloxicam, with some relief.  He states that the prior injection helped for about 1 to 2 weeks.  No fevers or chills.  No falls or injuries.  8/29/2023 [facility]  ID: [id number]  Silviano Guzmán presents to the office for follow-up of his bilateral knee pain.  Patient continues to have bilateral (left greater than right) knee pain.  He reports difficulty with walking.  Prior viscosupplementation injections only helped for about 2 weeks.  Pain is worse with laying down and with walking.  He has tried multiple injections previously.  He has tried Tylenol and diclofenac, which gives some relief.  He has tried physical therapy, which gives temporary relief.  Patient underwent recent lumbar injection on 8/18, which helped with his back pain.  He can have some lateral hip pain.  No groin pain.  5/25/2023 [facility]  ID: [id number]  Silviano Guzmán presents to the office for follow-up of his bilateral knee pain.  Patient continues to have some bilateral knee pain.  He tolerated the injections well.  His pain has somewhat improved following viscosupplementation injection .  No fevers or chills.  Patient presents for his third bilateral knee viscosupplementation injections.   5/18/2023 [facility]  ID: [id number]  Silviano Guzmán presents to the office for follow-up of his bilateral knee pain.  Patient continues to have some bilateral knee pain.  He tolerated the injections well.  His pain has somewhat improved following viscosupplementation injection .  No fevers or chills.  Patient presents for his second bilateral knee viscosupplementation injections.   5/11/2023  Mr. Guzmán presented to the office for follow up of his bilateral knee pain. Patient continues to have bilateral knee pain (left greater than right). He reports that his pain is worse after drinking alcohol (patient has a history of gout). There have been no falls or injuries. No fevers or chills. Patient presents for viscosupplementation injections.  4/25/2023  Mr. Guzmán is a 77-year-old male who presents to the office for follow-up of his bilateral knee pain.  Patient has been experiencing bilateral (left greater than right) knee pain for several years.  He was previously seen in the office and referred to physical therapy.  He has been in physical therapy, which helps while he is at physical therapy, but his pain returns when he goes home.  He states that overall, his pain is unchanged.  Pain is located over the anterior knee and radiates down the leg to the foot.  He underwent recent lumbar spine MRI by Dr. Barker.  No numbness or tingling.  He does have some tenderness over the knee and lower leg.  Patient has previously tried diclofenac and multiple injections for his knee pain.  3/31/2023 Mr. Guzmán is a 77-year-old male who presents to the office for evaluation of his bilateral knee pain.  Patient has been experiencing bilateral (left greater than right) knee pain for several years, but has been worsening over the last month.  He states that he has significant pain in that it can affect his sleep.  Pain is located over the medial, lateral, and anterior knees bilaterally.  Pain can radiate to the foot.  He also reports some crepitus.  Pain is worse at night and with walking.  He has not noted any relieving factors at this time.  He has tried diclofenac and Tylenol, without significant relief.  Patient has tried physical therapy, last about 1 month ago.  He has tried injections, last on 1/25/2023.  Patient was getting injections every 6 months with about 7 injections in the left knee and 2 injections in the right knee.  His last injection helped for about 2 to 3 weeks.  He is also previously had a knee aspiration, which helped.  Pain is now affecting his quality of life and causing difficulty sleeping and resting.  Patient has been using a cane for the last 3 to 4 years.  He does have a history of lumbar radiculopathy and was referred by his primary care physician to pain management for potential epidural injections.  Patient also has a history of a right knee fracture in 1980 that was treated nonoperatively.  History: CAD s/p cath, CHF, HTN, HLD, ASHOK, BPH, Gout, Lumbar Radiculopathy, Diabetes

## 2024-05-02 NOTE — DISCUSSION/SUMMARY
[de-identified] : Silviano Guzmán is a 78-year-old male who presented to the office for follow-up of his right total knee arthroplasty.  Patient underwent right TKA on 2/7/2024.  He is doing well overall. XRays showed left total knee arthroplasty in good position and alignment. Examination showed range of motion 0 to 120. Discussed with the patient the examination and imaging findings. Discussed the management of total knee arthroplasty at this time, including physical therapy and home exercises. Patient will continue physical therapy and home exercises. Patient will participate in activities as tolerated. Patient will follow up in 3 months for reevaluation and management. Patient understanding and in agreement with the plan. All questions answered.     Plan: -Physical Therapy -Home Exercises -Activities as Tolerated -Follow up in 3 months for reevaluation and management

## 2024-08-06 ENCOUNTER — APPOINTMENT (OUTPATIENT)
Dept: ORTHOPEDIC SURGERY | Facility: CLINIC | Age: 79
End: 2024-08-06

## 2024-08-06 PROCEDURE — 99213 OFFICE O/P EST LOW 20 MIN: CPT

## 2024-08-06 NOTE — REVIEW OF SYSTEMS
[Joint Stiffness] : no joint stiffness [Joint Swelling] : no joint swelling [Fever] : no fever [Chills] : no chills

## 2024-08-06 NOTE — PHYSICAL EXAM
[de-identified] : Constitutional:  79 year old male, alert and oriented, cooperative, in no acute distress.  HEENT  NC/AT.  Appearance: symmetric  Neck/Back Straight without deformity or instability.  Good ROM.  Chest/Respiratory  Respiratory effort: no intercostal retractions or use of accessory muscles. Nonlabored Breathing  Skin  On inspection, warm and dry without rashes or lesions.  Mental Status:  Judgment, insight: intact Orientation: oriented to time, place, and person  Neurological: Sensory and Motor are grossly intact throughout  Left Knee  Inspection:     Incision well healing. No erythema or drainage     No Effusion      Range of Motion: 	Extension - 0 degrees 	Flexion - 125 degrees 	Alignment - Valgus 3 degrees 	Extensor lag: None  Stability:      Demonstrates no Varus or Valgus instability      Negative Anterior or Posterior drawer.      No mid flexion instability  Patella: stable, tracks well.   Neurologic Exam     Motor intact including 5/5 Extensor Hallucis Longus, 5/5 Flexor Hallucis Longus, 5/5 Tibialis Anterior and 5/5 Gastrocnemius     Sensation Intact to Light Touch including Saphenous, Sural, Superficial Peroneal, Deep Peroneal, Tibial nerve distributions  Vascular Exam     Foot is warm and well perfused with 2+ Dorsalis Pedis Pulse   No pain with range of motion of the bilateral hips or right knee. No lumbar paraspinal muscle tenderness.  [de-identified] : XRay:  XRays of the Left Knee (3 Views) taken on 3/21/2024. XRays demonstrate a Left Total Knee Arthroplasty in good position and alignment. There is no obvious evidence of fracture, dislocation, osteolysis or loosening. (my personal interpretation) Components: [employer] Cemented

## 2024-08-06 NOTE — HISTORY OF PRESENT ILLNESS
[de-identified] : 8/6/2024  Silviano Guzmán presented to the office for follow-up of his left total knee arthroplasty.  Patient underwent left TKA on 2/7/2024.  He is currently doing well overall.  He does not have significant left knee pain.  No falls.  No fevers or chills.  Patient is planned for a pulmonary procedure next month.  5/2/2024  Silviano Guzmán presents to the office for follow-up of his left total knee arthroplasty.  Patient underwent left TKA on 2/7/2024.  He is currently doing well overall.  He is walking without assistive devices.  Patient has been in physical therapy.  No falls.  No fevers or chills.  3/21/2024 [facility]  ID: [id number]  Silviano Guzmán presents to the office for follow-up of his left total knee arthroplasty.  Patient underwent left TKA on 2/7/2024.  He is currently doing well overall.  Patient does have some constipation.  His pain is improving, but he still has some pain.  He is walking with a cane.  No falls.  No fevers or chills.  Patient has completed his DVT prophylaxis.  2/22/2024 [facility]  ID: [id number]  Silviano Guzmán presented to the office for follow-up of his left total knee arthroplasty.  Patient underwent left TKA on 2/7/2024.  He is currently doing well overall.  He does have some knee pain.  Patient has been in home physical therapy.  He is taking his aspirin for DVT prophylaxis.  No falls.  No fevers or chills.  1/11/2024 [facility]  ID: [id number]  Silviano Guzmán presents to the office for follow-up of his bilateral knee pain.  Patient continues to have bilateral (left greater than right) knee pain.  Pain can cause difficulty walking.  He has tried Tylenol and meloxicam.  Patient tried prior injections.  No falls.  No fevers or chills.  11/30/2023 [facility]  ID: [id number]  Silviano Guzmán presented to the office for follow-up of his bilateral knee pain.  Patient states that he has experienced continued bilateral knee pain (left greater than right.  He states that he has pain with walking.  Pain is worse over the medial knee.  Patient has tried physical therapy.  He has tried Tylenol and meloxicam, with some relief.  He states that the prior injection helped for about 1 to 2 weeks.  No fevers or chills.  No falls or injuries.  8/29/2023 [facility]  ID: [id number]  Silviano Guzmán presents to the office for follow-up of his bilateral knee pain.  Patient continues to have bilateral (left greater than right) knee pain.  He reports difficulty with walking.  Prior viscosupplementation injections only helped for about 2 weeks.  Pain is worse with laying down and with walking.  He has tried multiple injections previously.  He has tried Tylenol and diclofenac, which gives some relief.  He has tried physical therapy, which gives temporary relief.  Patient underwent recent lumbar injection on 8/18, which helped with his back pain.  He can have some lateral hip pain.  No groin pain.  5/25/2023 [facility]  ID: [id number]  Silviano Guzmán presents to the office for follow-up of his bilateral knee pain.  Patient continues to have some bilateral knee pain.  He tolerated the injections well.  His pain has somewhat improved following viscosupplementation injection .  No fevers or chills.  Patient presents for his third bilateral knee viscosupplementation injections.   5/18/2023 [facility]  ID: [id number]  Silviano Guzmán presents to the office for follow-up of his bilateral knee pain.  Patient continues to have some bilateral knee pain.  He tolerated the injections well.  His pain has somewhat improved following viscosupplementation injection .  No fevers or chills.  Patient presents for his second bilateral knee viscosupplementation injections.   5/11/2023  Mr. Guzmán presented to the office for follow up of his bilateral knee pain. Patient continues to have bilateral knee pain (left greater than right). He reports that his pain is worse after drinking alcohol (patient has a history of gout). There have been no falls or injuries. No fevers or chills. Patient presents for viscosupplementation injections.  4/25/2023  Mr. Guzmán is a 77-year-old male who presents to the office for follow-up of his bilateral knee pain.  Patient has been experiencing bilateral (left greater than right) knee pain for several years.  He was previously seen in the office and referred to physical therapy.  He has been in physical therapy, which helps while he is at physical therapy, but his pain returns when he goes home.  He states that overall, his pain is unchanged.  Pain is located over the anterior knee and radiates down the leg to the foot.  He underwent recent lumbar spine MRI by Dr. Barker.  No numbness or tingling.  He does have some tenderness over the knee and lower leg.  Patient has previously tried diclofenac and multiple injections for his knee pain.  3/31/2023 Mr. Guzmán is a 77-year-old male who presents to the office for evaluation of his bilateral knee pain.  Patient has been experiencing bilateral (left greater than right) knee pain for several years, but has been worsening over the last month.  He states that he has significant pain in that it can affect his sleep.  Pain is located over the medial, lateral, and anterior knees bilaterally.  Pain can radiate to the foot.  He also reports some crepitus.  Pain is worse at night and with walking.  He has not noted any relieving factors at this time.  He has tried diclofenac and Tylenol, without significant relief.  Patient has tried physical therapy, last about 1 month ago.  He has tried injections, last on 1/25/2023.  Patient was getting injections every 6 months with about 7 injections in the left knee and 2 injections in the right knee.  His last injection helped for about 2 to 3 weeks.  He is also previously had a knee aspiration, which helped.  Pain is now affecting his quality of life and causing difficulty sleeping and resting.  Patient has been using a cane for the last 3 to 4 years.  He does have a history of lumbar radiculopathy and was referred by his primary care physician to pain management for potential epidural injections.  Patient also has a history of a right knee fracture in 1980 that was treated nonoperatively.  History: CAD s/p cath, CHF, HTN, HLD, ASHOK, BPH, Gout, Lumbar Radiculopathy, Diabetes

## 2024-08-06 NOTE — DISCUSSION/SUMMARY
[de-identified] : Silviano Guzmán is a 79-year-old male who presented to the office for follow-up of his right total knee arthroplasty.  Patient underwent right TKA on 2/7/2024.  He is doing well overall. XRays showed left total knee arthroplasty in good position and alignment. Examination showed range of motion 0 to 120. Discussed with the patient the examination and imaging findings. Discussed the management of total knee arthroplasty at this time, including home exercises. Patient will continue his home exercises. Patient will participate in activities as tolerated.Patient would like to consider dental implants.  Discussed the patient would require antibiotics prior to dental surgery. Patient will follow up in 6 months for reevaluation and management. Patient understanding and in agreement with the plan. All questions answered.     Plan: -Home Exercises -Activities as Tolerated -Follow up in 6 months for reevaluation and management

## 2024-10-24 ENCOUNTER — APPOINTMENT (OUTPATIENT)
Dept: ORTHOPEDIC SURGERY | Facility: CLINIC | Age: 79
End: 2024-10-24
Payer: MEDICARE

## 2024-10-24 DIAGNOSIS — Z96.659 PRESENCE OF UNSPECIFIED ARTIFICIAL KNEE JOINT: ICD-10-CM

## 2024-10-24 PROCEDURE — 73562 X-RAY EXAM OF KNEE 3: CPT | Mod: LT

## 2024-10-24 PROCEDURE — 99213 OFFICE O/P EST LOW 20 MIN: CPT

## 2024-10-24 RX ORDER — AMOXICILLIN 500 MG/1
500 CAPSULE ORAL
Qty: 4 | Refills: 0 | Status: ACTIVE | COMMUNITY
Start: 2024-10-24 | End: 1900-01-01

## 2024-10-30 NOTE — OCCUPATIONAL THERAPY INITIAL EVALUATION ADULT - PHYSICAL ASSIST/NONPHYSICAL ASSIST: TOILET, REHAB EVAL
Detail Level: Simple
Render Risk Assessment In Note?: no
Comment: Previous lesion on nose in 10/2023 treated w/ freezing resolved on todays exam
verbal cues/nonverbal cues (demo/gestures)/1 person assist

## 2025-02-06 ENCOUNTER — APPOINTMENT (OUTPATIENT)
Dept: ORTHOPEDIC SURGERY | Facility: CLINIC | Age: 80
End: 2025-02-06
Payer: MEDICARE

## 2025-02-06 DIAGNOSIS — Z96.659 PRESENCE OF UNSPECIFIED ARTIFICIAL KNEE JOINT: ICD-10-CM

## 2025-02-06 PROCEDURE — 99213 OFFICE O/P EST LOW 20 MIN: CPT

## (undated) DEVICE — SYR LUER LOK 20CC

## (undated) DEVICE — MAKO CHECKPOINT KIT FEMORAL / TIBIAL

## (undated) DEVICE — TOURNIQUET ESMARK 6"

## (undated) DEVICE — SUT QUILL PDO 0 45CM 36MM

## (undated) DEVICE — SAW BLADE STRYKER RECIPROCATING DOUBLE EDGE 68X12.5MM

## (undated) DEVICE — SOL IRR BAG NS 0.9% 3000ML

## (undated) DEVICE — DRAPE EXTREMITY 87" X 106" X 128"

## (undated) DEVICE — SUT VICRYL 0 27" OS-6 UNDYED

## (undated) DEVICE — SUT VICRYL 1 36" CTX UNDYED

## (undated) DEVICE — DRSG DERMABOND 0.7ML

## (undated) DEVICE — DRAPE 3/4 SHEET 52X76"

## (undated) DEVICE — HOOD T5 PEELAWAY

## (undated) DEVICE — SOL IRR POUR H2O 1500ML

## (undated) DEVICE — ELCTR GROUNDING PAD ADULT COVIDIEN

## (undated) DEVICE — GOWN XXL

## (undated) DEVICE — WARMING BLANKET FULL ADULT

## (undated) DEVICE — HANDPIECE INTERPULSE W/ COAXIAL FAN SPRAY TIP

## (undated) DEVICE — STRYKER MIXEVAC 3 BONE CEMENT MIXER

## (undated) DEVICE — WOUND IRR SURGIPHOR

## (undated) DEVICE — SUT QUILL MONODERM 0 36CM 36MM

## (undated) DEVICE — ELCTR BOVIE PENCIL SMOKE EVACUATION

## (undated) DEVICE — DRSG CURITY GAUZE SPONGE 4 X 4" 12-PLY

## (undated) DEVICE — PROTECTOR HEEL / ELBOW

## (undated) DEVICE — DRSG STOCKINETTE IMPERVIOUS XL

## (undated) DEVICE — VENODYNE/SCD SLEEVE CALF MEDIUM

## (undated) DEVICE — TAPE SILK 3"

## (undated) DEVICE — SUCTION YANKAUER NO CONTROL VENT

## (undated) DEVICE — MAKO VIZADISC KNEE TRACKING KIT

## (undated) DEVICE — DRSG AQUACEL 3.5 X 10"

## (undated) DEVICE — SAW BLADE STRYKER THCK LONG 1.24X83.5X18.5MM

## (undated) DEVICE — DRAPE SPLIT SHEET 77" X 120"

## (undated) DEVICE — PREP CHLORAPREP HI-LITE ORANGE 26ML

## (undated) DEVICE — TOURNIQUET CUFF 34" DUAL PORT W PLC

## (undated) DEVICE — MAKO DRAPE KIT

## (undated) DEVICE — LABELS BLANK W PEN

## (undated) DEVICE — DRSG AQUACEL 3.5 X 12"

## (undated) DEVICE — SAW BLADE STRYKER SAGITTAL 3 HOLE OSCILLATING

## (undated) DEVICE — SOL IRR POUR NS 0.9% 1500ML

## (undated) DEVICE — POSITIONER STRAP ARMBOARD VELCRO TS-30

## (undated) DEVICE — DRSG COBAN 6"

## (undated) DEVICE — NDL HYPO SAFE 21G X 1.5" (GREEN)

## (undated) DEVICE — FRAZIER SUCTION TIP 8FR

## (undated) DEVICE — DRSG ACE BANDAGE 6"

## (undated) DEVICE — PACK LIJ BASIC ORTHO

## (undated) DEVICE — DRAPE U POLY BLUE 60"X60"

## (undated) DEVICE — MAKO BLADE STANDARD

## (undated) DEVICE — GLV 8 PROTEXIS (CREAM) MICRO

## (undated) DEVICE — SUT QUILL MONODERM 3-0 30CM 24MM

## (undated) DEVICE — PACK TOTAL JOINT

## (undated) DEVICE — SUT QUILL PDO 2 36CM 40MM